# Patient Record
Sex: FEMALE | Race: WHITE | Employment: OTHER | URBAN - METROPOLITAN AREA
[De-identification: names, ages, dates, MRNs, and addresses within clinical notes are randomized per-mention and may not be internally consistent; named-entity substitution may affect disease eponyms.]

---

## 2021-02-02 ENCOUNTER — APPOINTMENT (OUTPATIENT)
Dept: CT IMAGING | Facility: CLINIC | Age: 36
End: 2021-02-02
Attending: EMERGENCY MEDICINE
Payer: MEDICAID

## 2021-02-02 ENCOUNTER — HOSPITAL ENCOUNTER (EMERGENCY)
Facility: CLINIC | Age: 36
Discharge: HOME | End: 2021-02-02
Attending: EMERGENCY MEDICINE | Admitting: EMERGENCY MEDICINE
Payer: MEDICAID

## 2021-02-02 VITALS
RESPIRATION RATE: 16 BRPM | TEMPERATURE: 98.4 F | BODY MASS INDEX: 25.03 KG/M2 | OXYGEN SATURATION: 100 % | WEIGHT: 136 LBS | HEART RATE: 75 BPM | SYSTOLIC BLOOD PRESSURE: 129 MMHG | HEIGHT: 62 IN | DIASTOLIC BLOOD PRESSURE: 73 MMHG

## 2021-02-02 DIAGNOSIS — R33.9 URINARY RETENTION: ICD-10-CM

## 2021-02-02 LAB
ALBUMIN SERPL-MCNC: 4.3 G/DL (ref 3.4–5)
ALBUMIN UR-MCNC: ABNORMAL MG/DL
ALP SERPL-CCNC: 66 U/L (ref 40–150)
ALT SERPL W P-5'-P-CCNC: 35 U/L (ref 0–50)
ANION GAP SERPL CALCULATED.3IONS-SCNC: 6 MMOL/L (ref 3–14)
APPEARANCE UR: CLEAR
AST SERPL W P-5'-P-CCNC: 18 U/L (ref 0–45)
B-HCG FREE SERPL-ACNC: <5 IU/L
BACTERIA #/AREA URNS HPF: ABNORMAL /HPF
BASOPHILS # BLD AUTO: 0 10E9/L (ref 0–0.2)
BASOPHILS NFR BLD AUTO: 0.4 %
BILIRUB SERPL-MCNC: 1.6 MG/DL (ref 0.2–1.3)
BILIRUB UR QL STRIP: ABNORMAL
BUN SERPL-MCNC: 7 MG/DL (ref 7–30)
CALCIUM SERPL-MCNC: 9.2 MG/DL (ref 8.5–10.1)
CHLORIDE SERPL-SCNC: 107 MMOL/L (ref 94–109)
CO2 SERPL-SCNC: 25 MMOL/L (ref 20–32)
COLOR UR AUTO: ABNORMAL
CREAT SERPL-MCNC: 0.7 MG/DL (ref 0.52–1.04)
DIFFERENTIAL METHOD BLD: NORMAL
EOSINOPHIL # BLD AUTO: 0 10E9/L (ref 0–0.7)
EOSINOPHIL NFR BLD AUTO: 0.1 %
ERYTHROCYTE [DISTWIDTH] IN BLOOD BY AUTOMATED COUNT: 12.5 % (ref 10–15)
GFR SERPL CREATININE-BSD FRML MDRD: >90 ML/MIN/{1.73_M2}
GLUCOSE SERPL-MCNC: 95 MG/DL (ref 70–99)
GLUCOSE UR STRIP-MCNC: ABNORMAL MG/DL
HCT VFR BLD AUTO: 40.9 % (ref 35–47)
HGB BLD-MCNC: 13.8 G/DL (ref 11.7–15.7)
HGB UR QL STRIP: ABNORMAL
IMM GRANULOCYTES # BLD: 0 10E9/L (ref 0–0.4)
IMM GRANULOCYTES NFR BLD: 0.3 %
KETONES UR STRIP-MCNC: ABNORMAL MG/DL
LEUKOCYTE ESTERASE UR QL STRIP: ABNORMAL
LYMPHOCYTES # BLD AUTO: 1.7 10E9/L (ref 0.8–5.3)
LYMPHOCYTES NFR BLD AUTO: 23.6 %
MCH RBC QN AUTO: 31.4 PG (ref 26.5–33)
MCHC RBC AUTO-ENTMCNC: 33.7 G/DL (ref 31.5–36.5)
MCV RBC AUTO: 93 FL (ref 78–100)
MONOCYTES # BLD AUTO: 0.4 10E9/L (ref 0–1.3)
MONOCYTES NFR BLD AUTO: 5.8 %
NEUTROPHILS # BLD AUTO: 5.1 10E9/L (ref 1.6–8.3)
NEUTROPHILS NFR BLD AUTO: 69.8 %
NITRATE UR QL: ABNORMAL
NRBC # BLD AUTO: 0 10*3/UL
NRBC BLD AUTO-RTO: 0 /100
PH UR STRIP: ABNORMAL PH (ref 5–7)
PLATELET # BLD AUTO: 370 10E9/L (ref 150–450)
POTASSIUM SERPL-SCNC: 3.7 MMOL/L (ref 3.4–5.3)
PROT SERPL-MCNC: 7.8 G/DL (ref 6.8–8.8)
RBC # BLD AUTO: 4.4 10E12/L (ref 3.8–5.2)
RBC #/AREA URNS AUTO: 3 /HPF (ref 0–2)
SODIUM SERPL-SCNC: 138 MMOL/L (ref 133–144)
SOURCE: ABNORMAL
SP GR UR STRIP: ABNORMAL (ref 1–1.03)
SQUAMOUS #/AREA URNS AUTO: <1 /HPF (ref 0–1)
UROBILINOGEN UR STRIP-MCNC: ABNORMAL MG/DL (ref 0–2)
WBC # BLD AUTO: 7.4 10E9/L (ref 4–11)
WBC #/AREA URNS AUTO: 2 /HPF (ref 0–5)

## 2021-02-02 PROCEDURE — 85025 COMPLETE CBC W/AUTO DIFF WBC: CPT | Performed by: EMERGENCY MEDICINE

## 2021-02-02 PROCEDURE — 99285 EMERGENCY DEPT VISIT HI MDM: CPT | Mod: 25

## 2021-02-02 PROCEDURE — 51700 IRRIGATION OF BLADDER: CPT | Performed by: UROLOGY

## 2021-02-02 PROCEDURE — 250N000011 HC RX IP 250 OP 636: Performed by: EMERGENCY MEDICINE

## 2021-02-02 PROCEDURE — 74177 CT ABD & PELVIS W/CONTRAST: CPT | Mod: 59

## 2021-02-02 PROCEDURE — 80053 COMPREHEN METABOLIC PANEL: CPT | Performed by: EMERGENCY MEDICINE

## 2021-02-02 PROCEDURE — 250N000013 HC RX MED GY IP 250 OP 250 PS 637: Performed by: EMERGENCY MEDICINE

## 2021-02-02 PROCEDURE — 81001 URINALYSIS AUTO W/SCOPE: CPT | Performed by: EMERGENCY MEDICINE

## 2021-02-02 PROCEDURE — 96374 THER/PROPH/DIAG INJ IV PUSH: CPT

## 2021-02-02 PROCEDURE — 99203 OFFICE O/P NEW LOW 30 MIN: CPT | Mod: 25 | Performed by: UROLOGY

## 2021-02-02 PROCEDURE — 84702 CHORIONIC GONADOTROPIN TEST: CPT

## 2021-02-02 RX ORDER — OXYCODONE HYDROCHLORIDE 5 MG/1
5 TABLET ORAL ONCE
Status: COMPLETED | OUTPATIENT
Start: 2021-02-02 | End: 2021-02-02

## 2021-02-02 RX ORDER — IOPAMIDOL 755 MG/ML
500 INJECTION, SOLUTION INTRAVASCULAR ONCE
Status: COMPLETED | OUTPATIENT
Start: 2021-02-02 | End: 2021-02-02

## 2021-02-02 RX ORDER — KETOROLAC TROMETHAMINE 15 MG/ML
15 INJECTION, SOLUTION INTRAMUSCULAR; INTRAVENOUS ONCE
Status: COMPLETED | OUTPATIENT
Start: 2021-02-02 | End: 2021-02-02

## 2021-02-02 RX ORDER — LIDOCAINE HYDROCHLORIDE 20 MG/ML
JELLY TOPICAL
Status: DISCONTINUED
Start: 2021-02-02 | End: 2021-02-03 | Stop reason: HOSPADM

## 2021-02-02 RX ADMIN — IOPAMIDOL 69 ML: 755 INJECTION, SOLUTION INTRAVENOUS at 18:34

## 2021-02-02 RX ADMIN — KETOROLAC TROMETHAMINE 15 MG: 15 INJECTION, SOLUTION INTRAMUSCULAR; INTRAVENOUS at 18:01

## 2021-02-02 RX ADMIN — OXYCODONE HYDROCHLORIDE 5 MG: 5 TABLET ORAL at 22:10

## 2021-02-02 ASSESSMENT — ENCOUNTER SYMPTOMS
FEVER: 0
VOMITING: 0
DYSURIA: 1
HEMATURIA: 1
DIFFICULTY URINATING: 1
NAUSEA: 0
FLANK PAIN: 1

## 2021-02-02 ASSESSMENT — MIFFLIN-ST. JEOR: SCORE: 1270.14

## 2021-02-02 NOTE — ED PROVIDER NOTES
History   Chief Complaint:  Abdominal Pain and Dysuria     The history is provided by the patient.      Aarti Clemons is a 35 year old female who presents with abdominal pain and dysuria. The patient explains that she has been experiencing severe bladder pain since August of 2020. She has gone into the clinic multiple times; in some instances, they found nothing that caused concern for infection, but the pain would not subside so when she would return they would provide her with antibiotics. She was prompted to come in to the ED today after finding her urine to be red last night. She has noticed blood in her urine twice. She explains that she feels pain and burning when attempting to urinate and she cannot get a steady stream started, only droplets. The patient also notices that she experiences sharp flank pain every now and then. She was given Bactrim, Oxybutynin, and Azo to help with her symptoms, but she explains that nothing has helped her. She has not tried taking Ibuprofen or Tylenol. The last time she was prescribed antibiotics before recent urgent care visit was back in October.    The patient is on Nexplanon for birth control, after having four children. Her last birth was almost 4 years ago.   The patient denies any abnormal vaginal discharge or any chance of STD's. She also denies fever, nausea, or vomiting.     Review of Systems   Constitutional: Negative for fever.   Gastrointestinal: Negative for nausea and vomiting. Abdominal pain: lower bladder region.   Genitourinary: Positive for decreased urine volume, difficulty urinating, dysuria, flank pain (occasional ), hematuria and pelvic pain. Negative for vaginal discharge.   All other systems reviewed and are negative.    Allergies:  No Known Allergies    Medications:  Bactrim  Oxybutynin  Azo    Past Medical History:    Pregnancy (x4)     Social History:  Presents with male   Has 4 children     Physical Exam     Patient Vitals for  "the past 24 hrs:   BP Temp Temp src Pulse Resp SpO2 Height Weight   02/02/21 2120 133/79 -- -- -- -- 98 % -- --   02/02/21 1848 -- -- -- -- -- 99 % -- --   02/02/21 1800 -- -- -- -- -- 97 % -- --   02/02/21 1730 115/77 -- -- 77 -- 98 % -- --   02/02/21 1602 (!) 152/107 98.4  F (36.9  C) Temporal 84 18 99 % 1.575 m (5' 2\") 61.7 kg (136 lb)       Physical Exam  Nursing note and vitals reviewed.  Constitutional: Cooperative.   HENT:   Mouth/Throat: Moist mucous membranes.   Eyes: EOMI, nonicteric sclera  Cardiovascular: Normal rate, regular rhythm, no murmurs, rubs, or gallops  Pulmonary/Chest: Effort normal and breath sounds normal. No respiratory distress. No wheezes. No rales.   Abdominal: Soft. Suprapubic tenderness and fullness, nondistended, no guarding or rigidity.   Musculoskeletal: Normal range of motion.   Neurological: Alert. Moves all extremities spontaneously.   Skin: Skin is warm and dry. No rash noted.   Psychiatric: Normal mood and affect.       Emergency Department Course     Imaging:  CT Abdomen/Pelvis  with IV Contrast only TRAUMA/AAA:  1.  Prominent distention of urinary bladder suggesting neurogenic bladder or bladder outlet obstruction. Recommend draining and determining true post void residual volume.  2.  There is likely a small benign mature dermoid within left ovary. Dominant follicle within right ovary.  3.  Mild fatty infiltration of liver.   As per radiology.    Laboratory:  CMP: Bilirubin 1.6(H) o/w WNL (Creatinine: 0.70)  CBC: WBC: 7.4, HGB: 13.8, PLT: 370    ISTAT HCG Quantitative Pregnancy POCT: <5.0  UA with Microscopic reflex to Culture: Glucose unable to assay due to interfering substance(A), Bilirubin unable to assay due to interfering substance(A), Ketones unable to assay due to interfering substance(A), Blood unable to assay due to interfering substance(A), Protein Albumin unable to assay due to interfering substance(A), Nitrite unable to assay due to interfering substance(A), " Leukocyte Esterase unable to assay due to interfering substance(A), RBC 3(H), Bacteria few (A) o/w Negative    Emergency Department Course:    Reviewed:  I reviewed nursing notes, vitals and past medical history    Assessments:  1609 I obtained history and examined the patient as noted above.   1910 I rechecked the patient and explained findings.   2040 I rechecked the patient.     Consults:   1918 I spoke with Dr. Palomo from urology   2037 I spoke with Dr. Palomo from urology     Interventions:  1801 Toradol 15 mg IV    Disposition:  The patient was discharged to home.     Impression & Plan     Medical Decision Making:  Pt presents with several month history of difficulty/painful urination. Broad ddx considered. No evidence of UTI on UA, though she is currently on bactrim. Ct obtained given suprapubic pain and fullness and is notable for distended bladder. Certainly, this is cause of pain today. We were unable to place catheter in ED, therefore reached out to Dr. Palomo from urology who graciously came to bedside and was able to place Lynn.  Plan will be for 1 week catheter, stop oxybutynin, and urology follow-up for removal and further testing.  No signs of LAWSON.  Patient feels improved after catheter placement. She is in stable condition at the time of discharge, indications for return to the ED were discussed as well as follow up. All questions were answered and she is in agreement with the plan.      Diagnosis:    ICD-10-CM    1. Urinary retention  R33.9        Scribe Disclosure:  ANI, Claudette Sanchez, am serving as a scribe at 5:04 PM on 2/2/2021 to document services personally performed by Brennon Castaneda MD based on my observations and the provider's statements to me.            Brennon Castaneda MD  02/03/21 9955

## 2021-02-03 ENCOUNTER — NURSE TRIAGE (OUTPATIENT)
Dept: NURSING | Facility: CLINIC | Age: 36
End: 2021-02-03

## 2021-02-03 ENCOUNTER — APPOINTMENT (OUTPATIENT)
Dept: INTERPRETER SERVICES | Facility: CLINIC | Age: 36
End: 2021-02-03
Payer: MEDICAID

## 2021-02-03 NOTE — CONSULTS
Urology Consult History and Physical    Name: Aarti Clemons    MRN: [id number]   YOB: 1985       We were asked to see Aarti Clemons at the request of Dr. Waddell for evaluation and treatment of urinary retention.          Chief Complaint:   Urinary retention     History is obtained from the patient          History of Present Illness:   Aarti Clemons is a 35 year old female who is being seen for evaluation of urinary retention.  She notes that she has been having increased difficulty with urination for the past 7 months.  She notes that she has been having increased small volume voids with a sensation of bladder fullness and at times some bladder pain and pelvic pain.  She was previously seen in October in an urgent care for possible UTI which was negative.  Her symptoms worsened over the weekend and she was seen again in urgent care setting with possible UTI and started on Bactrim was also given a prescription for Ditropan for urinary frequency.  The past few days she has had worsening increased difficulty urinating with really only small drips of urine passing through the urethra.    She denies any prior vaginal surgery.  She has 4 children delivered vaginally.  She does not recall any significant vaginal lacerations or urethral lacerations which required surgical intervention at the time.  Her youngest child is 4 years old.    CT scan obtained in the emergency room demonstrates significantly distended bladder.  Emergency room RN staff unable to successfully pass urethra into the bladder with resistance felt about 2 cm into the urethral meatus.           Past Medical History:   No past medical history on file.         Past Surgical History:   No past surgical history on file.         Social History:     Social History     Tobacco Use     Smoking status: Not on file   Substance Use Topics     Alcohol use: Not on file            Family History:   No family history on  "file.           Allergies:   No Known Allergies         Medications:     Current Facility-Administered Medications   Medication     lidocaine (XYLOCAINE) 2 % external gel     No current outpatient medications on file.             Review of Systems:    ROS: 10 point ROS neg other than the symptoms noted above in the HPI.          Physical Exam:     Patient Vitals for the past 24 hrs:   BP Temp Temp src Pulse Resp SpO2 Height Weight   02/02/21 2120 133/79 -- -- -- -- 98 % -- --   02/02/21 1848 -- -- -- -- -- 99 % -- --   02/02/21 1800 -- -- -- -- -- 97 % -- --   02/02/21 1730 115/77 -- -- 77 -- 98 % -- --   02/02/21 1602 (!) 152/107 98.4  F (36.9  C) Temporal 84 18 99 % 1.575 m (5' 2\") 61.7 kg (136 lb)     General: age-appropriate appearing female in NAD  HEENT: Head AT/NC, EOMI, CN Grossly intact  Lungs: no respiratory distress, or pursed lip breathing  Heart: No obvious jugular venous distension present  Back: no bony midline tenderness, no CVAT bilaterally.  Abdomen: Distended bladder in the low midline with moderate tenderness  : normal female external genitalia with no evidence of prolapse  Lymph: no palpable inguinal lymphadenopathy.  LE: no edema. pneumoboots in place.  Musculoskeltal: extremities normal, no peripheral edema  Skin: no suspicious lesions or rashes  Neuro: Alert, oriented, speech and mentation normal;  moving all 4 extremities equally.  Psych: affect and mood normal          Data:   All laboratory data reviewed:    Recent Labs   Lab 02/02/21  1723   WBC 7.4   HGB 13.8        Recent Labs   Lab 02/02/21  1723      POTASSIUM 3.7   CHLORIDE 107   CO2 25   BUN 7   CR 0.70   GLC 95   ANAY 9.2     Recent Labs   Lab 02/02/21  1723   COLOR [place]   APPEARANCE Clear   URINEGLC Unable to assay due to interfering substance*   URINEBILI Unable to assay due to interfering substance*   URINEKETONE Unable to assay due to interfering substance*   SG Unable to assay due to interfering substance "   URINEPH Unable to assay due to interfering substance   PROTEIN Unable to assay due to interfering substance*   NITRITE Unable to assay due to interfering substance*   LEUKEST Unable to assay due to interfering substance*   RBCU 3*   WBCU 2     IMAGING:  All pertinent imaging reviewed:    All imaging studies reviewed by me.  I personally reviewed these imaging films.  A formal report from radiology will follow.    FINDINGS:   LOWER CHEST: Normal.     HEPATOBILIARY: Mild diffuse fatty infiltration of liver.     PANCREAS: Normal.     SPLEEN: Normal.     ADRENAL GLANDS: Normal.     KIDNEYS/BLADDER: Kidneys appear normal.     However, there is prominent distention of the urinary bladder with estimated volume of approximately 700 mL.     BOWEL: No obstruction or inflammatory change. Appendix normal.     LYMPH NODES: Normal.     VASCULATURE: Unremarkable.     PELVIC ORGANS: Both ovaries displaced superiorly by the distended bladder. 2.8 cm dominant follicle within right ovary. Left ovary contains a 1.8 cm structure that appears to have a lipid fluid level within it, likely a small benign dermoid cyst. No free   fluid.     MUSCULOSKELETAL: Normal.                                                                      IMPRESSION:   1.  Prominent distention of urinary bladder suggesting neurogenic bladder or bladder outlet obstruction. Recommend draining and determining true post void residual volume.  2.  There is likely a small benign mature dermoid within left ovary. Dominant follicle within right ovary.  3.  Mild fatty infiltration of liver.         Impression and Plan:   Impression:   35-year-old female with acute on chronic urinary retention      Plan:   Acute on chronic urinary retention  -We discussed that the etiology of her urinary retention is not clear at this time given her lack of transvaginal surgery  -We discussed the need for bladder drainage with a urethral catheter  -She was prepped in clean/sterile  technique.  6 cc of lidocaine jelly was instilled into the urethra.  I was able to advance a 16 Austrian Lynn catheter into the bladder.  There was moderate amount of resistance noted about 2 cm from the meatus, however the catheter was able to pass into the bladder.  The bladder was drained with greater than 1 L.  -We will plan to maintain his Lynn catheter for least 1 week  -I will arrange office follow-up for a trial of void and possible cystoscopy with either myself or my partner Dr. Caraballo  -We discussed that she should continue her previously prescribed course of Bactrim  -We discussed that she should stop her Ditropan     Jose Palomo MD   Urology  [facility]  Clinic Phone [phone]

## 2021-02-03 NOTE — ED NOTES
Attempted for indwelling abdullahi catheter insertion with difficulties by BRAD Sadler. Was able to insert catheter into the urethra, but unfortunately was unable to advance the catheter further. MD cui.

## 2021-02-03 NOTE — DISCHARGE INSTRUCTIONS
Stop oxybutynin.  Continue Bactrim.   Follow-up with urology in about 1 week. They will take catheter out at that time.

## 2021-02-03 NOTE — ED NOTES
Pt back from CT, states her abdominal pain is still there, no relief with Toradol. Also states she now has a headache. Will notify provider.

## 2021-02-04 NOTE — TELEPHONE ENCOUNTER
"  \"I was in the ER yesterday 2/2 see epic and I have a catheter. I am not having pain. I do see pee in the bag , I just emptied it. But I can see a little more of the tubing. I am afraid it will come out.\"   Denies sx   Gave home care advice and when to seek emergency care  Call back if needed.  Triny Almanza RN [facility]    COVID 19 Nurse Triage Plan/Patient Instructions    Please be aware that novel coronavirus (COVID-19) may be circulating in the community. If you develop symptoms such as fever, cough, or SOB or if you have concerns about the presence of another infection including coronavirus (COVID-19), please contact your health care provider or visit [email or web address].     Disposition/Instructions    Additional COVID19 information to add for patients.   How can I protect others?  If you have symptoms (fever, cough, body aches or trouble breathing): Stay home and away from others (self-isolate) until:    At least 10 days have passed since your symptoms started, And     You ve had no fever--and no medicine that reduces fever--for 1 full day (24 hours), And      Your other symptoms have resolved (gotten better).     If you don t have symptoms, but a test showed that you have COVID-19 (you tested positive):    Stay home and away from others (self-isolate). Follow the tips under \"How do I self-isolate?\" below for 10 days (20 days if you have a weak immune system).    You don't need to be retested for COVID-19 before going back to school or work. As long as you're fever-free and feeling better, you can go back to school, work and other activities after waiting the 10 or 20 days.     How do I self-isolate?    Stay in your own room, even for meals. Use your own bathroom if you can.     Stay away from others in your home. No hugging, kissing or shaking hands. No visitors.    Don t go to work, school or anywhere else.     Clean  high touch  surfaces often (doorknobs, counters, handles, etc.). Use a " Will continue DuoNebs, steroids, and antibiotics  Will continue supplemental oxygen via nasal cannula to keep sats greater than 90%  Plan to discharge patient when clinically stable  However patient has met with palliative Care and is interested in hospice  Has signed an [place] post and has requested to be a DNR  Plan for discharge tomorrow with home hospice     household cleaning spray or wipes. You ll find a full list on the EPA website:  www.epa.gov/pesticide-registration/list-n-disinfectants-use-against-sars-cov-2.    Cover your mouth and nose with a mask, tissue or washcloth to avoid spreading germs.    Wash your hands and face often. Use soap and water.    Caregivers in these groups are at risk for severe illness due to COVID-19:  o People 65 years and older  o People who live in a nursing home or long-term care facility  o People with chronic disease (lung, heart, cancer, diabetes, kidney, liver, immunologic)  o People who have a weakened immune system, including those who:  - Are in cancer treatment  - Take medicine that weakens the immune system, such as corticosteroids  - Had a bone marrow or organ transplant  - Have an immune deficiency  - Have poorly controlled HIV or AIDS  - Are obese (body mass index of 40 or higher)  - Smoke regularly    Caregivers should wear gloves while washing dishes, handling laundry and cleaning bedrooms and bathrooms.    Use caution when washing and drying laundry: Don t shake dirty laundry, and use the warmest water setting that you can.    For more tips, go to www.cdc.gov/coronavirus/2019-ncov/downloads/10Things.pdf.    How can I take care of myself?  1. Get lots of rest. Drink extra fluids (unless a doctor has told you not to).     2. Take Tylenol (acetaminophen) for fever or pain. If you have liver or kidney problems, ask your family doctor if it s okay to take Tylenol.     Adults can take either:     650 mg (two 325 mg pills) every 4 to 6 hours, or     1,000 mg (two 500 mg pills) every 8 hours as needed.     Note: Don t take more than 3,000 mg in one day.   Acetaminophen is found in many medicines (both prescribed and over-the-counter medicines). Read all labels to be sure you don t take too much.     For children, check the Tylenol bottle for the right dose. The dose is based on the child s age or weight.    3. If you have other  health problems (like cancer, heart failure, an organ transplant or severe kidney disease): Call your specialty clinic if you don t feel better in the next 2 days.    4. Know when to call 911: Emergency warning signs include:    Trouble breathing or shortness of breath    Pain or pressure in the chest that doesn t go away    Feeling confused like you haven t felt before, or not being able to wake up    Bluish-colored lips or face    What are the symptoms of COVID-19?     The most common symptoms are cough, fever and trouble breathing.     Less common symptoms include body aches, chills, diarrhea (loose, watery poops), fatigue (feeling very tired), headache, runny nose, sore throat and loss of smell.    COVID-19 can cause severe coughing (bronchitis) and lung infection (pneumonia).    How does it spread?     The virus may spread when a person coughs or sneezes into the air. The virus can travel about 6 feet this way, and it can live on surfaces.      Common  (household disinfectants) will kill the virus.    Who is at risk?  Anyone can catch COVID-19 if they re around someone who has the virus.    How can others protect themselves?     Stay away from people who have COVID-19 (or symptoms of COVID-19).    Wash hands often with soap and water. Or, use hand  with at least 60% alcohol.    Avoid touching the eyes, nose or mouth.     Wear a face mask when you go out in public, when sick or when caring for a sick person.    Where can I get more information?    [facility]: About COVID-19: [email or web address]    CDC: What to Do If You re Sick: www.cdc.gov/coronavirus/2019-ncov/about/steps-when-sick.html    CDC: Ending Home Isolation: www.cdc.gov/coronavirus/2019-ncov/hcp/disposition-in-home-patients.html     CDC: Caring for Someone: www.cdc.gov/coronavirus/2019-ncov/if-you-are-sick/care-for-someone.html     MD: Interim Guidance for Hospital Discharge to Home:  www.[facility]./diseases/coronavirus/hcp/hospdischarge.pdf    [facility] clinical trials (COVID-19 research studies): clinicalaffairs.[facility]-clinical-trials     Below are the COVID-19 hotlines at the [place] Department of Health ([facility]). Interpreters are available.   o For health questions: Call 217-742-2856 or 1-816.217.5352 (7 a.m. to 7 p.m.)  o For questions about schools and childcare: Call 311-968-2783 or 1-507.385.8103 (7 a.m. to 7 p.m.)          Thank you for taking steps to prevent the spread of this virus.  o Limit your contact with others.  o Wear a simple mask to cover your cough.  o Wash your hands well and often.    Resources    [facility]: About COVID-19: [email or web address]    CDC: What to Do If You're Sick: www.cdc.gov/coronavirus/2019-ncov/about/steps-when-sick.html    CDC: Ending Home Isolation: www.cdc.gov/coronavirus/2019-ncov/hcp/disposition-in-home-patients.html     CDC: Caring for Someone: www.cdc.gov/coronavirus/2019-ncov/if-you-are-sick/care-for-someone.html     [facility]: Interim Guidance for Hospital Discharge to Home: www.[facility]./diseases/coronavirus/hcp/hospdischarge.pdf    [facility] clinical trials (COVID-19 research studies): clinicalaffairs.[facility]-clinical-trials     Below are the COVID-19 hotlines at the [place] Department [facility] ([facility]). Interpreters are available.   o For health questions: Call 040-682-5238 or 1-789.985.4603 (7 a.m. to 7 p.m.)  o For questions about schools and childcare: Call 296-570-2249 or 1-792.708.3921 (7 a.m. to 7 p.m.)                   Additional Information    Urinary catheter care, questions about    Protocols used: URINARY CATHETER SYMPTOMS AND [id number]

## 2021-02-07 ENCOUNTER — NURSE TRIAGE (OUTPATIENT)
Dept: NURSING | Facility: CLINIC | Age: 36
End: 2021-02-07

## 2021-02-07 ENCOUNTER — HOSPITAL ENCOUNTER (EMERGENCY)
Facility: CLINIC | Age: 36
Discharge: HOME | End: 2021-02-08
Attending: EMERGENCY MEDICINE | Admitting: EMERGENCY MEDICINE
Payer: MEDICAID

## 2021-02-07 VITALS
RESPIRATION RATE: 16 BRPM | DIASTOLIC BLOOD PRESSURE: 91 MMHG | OXYGEN SATURATION: 96 % | SYSTOLIC BLOOD PRESSURE: 137 MMHG | TEMPERATURE: 97.6 F | HEART RATE: 96 BPM

## 2021-02-07 DIAGNOSIS — Z97.8 COMPLAINT ABOUT URINARY CATHETER: ICD-10-CM

## 2021-02-07 DIAGNOSIS — N93.9 VAGINAL BLEEDING: ICD-10-CM

## 2021-02-07 DIAGNOSIS — R39.9 COMPLAINT ABOUT URINARY CATHETER: ICD-10-CM

## 2021-02-07 DIAGNOSIS — R33.9 URINARY RETENTION: ICD-10-CM

## 2021-02-07 LAB
ALBUMIN UR-MCNC: 100 MG/DL
APPEARANCE UR: CLEAR
BACTERIA #/AREA URNS HPF: ABNORMAL /HPF
BILIRUB UR QL STRIP: NEGATIVE
CAOX CRY #/AREA URNS HPF: ABNORMAL /HPF
COLOR UR AUTO: YELLOW
GLUCOSE UR STRIP-MCNC: NEGATIVE MG/DL
HGB UR QL STRIP: ABNORMAL
KETONES UR STRIP-MCNC: NEGATIVE MG/DL
LEUKOCYTE ESTERASE UR QL STRIP: ABNORMAL
MUCOUS THREADS #/AREA URNS LPF: PRESENT /LPF
NITRATE UR QL: NEGATIVE
PH UR STRIP: 6.5 PH (ref 5–7)
RBC #/AREA URNS AUTO: 74 /HPF (ref 0–2)
SOURCE: ABNORMAL
SP GR UR STRIP: 1.03 (ref 1–1.03)
SQUAMOUS #/AREA URNS AUTO: 3 /HPF (ref 0–1)
UROBILINOGEN UR STRIP-MCNC: NORMAL MG/DL (ref 0–2)
WBC #/AREA URNS AUTO: 21 /HPF (ref 0–5)

## 2021-02-07 PROCEDURE — 36415 COLL VENOUS BLD VENIPUNCTURE: CPT | Performed by: EMERGENCY MEDICINE

## 2021-02-07 PROCEDURE — 87086 URINE CULTURE/COLONY COUNT: CPT | Performed by: EMERGENCY MEDICINE

## 2021-02-07 PROCEDURE — 81001 URINALYSIS AUTO W/SCOPE: CPT | Performed by: EMERGENCY MEDICINE

## 2021-02-07 PROCEDURE — 99283 EMERGENCY DEPT VISIT LOW MDM: CPT

## 2021-02-07 PROCEDURE — 85025 COMPLETE CBC W/AUTO DIFF WBC: CPT | Performed by: EMERGENCY MEDICINE

## 2021-02-07 PROCEDURE — 84703 CHORIONIC GONADOTROPIN ASSAY: CPT | Performed by: EMERGENCY MEDICINE

## 2021-02-07 PROCEDURE — 80048 BASIC METABOLIC PNL TOTAL CA: CPT | Performed by: EMERGENCY MEDICINE

## 2021-02-07 ASSESSMENT — ENCOUNTER SYMPTOMS
HEMATURIA: 1
ABDOMINAL PAIN: 0

## 2021-02-08 LAB
ANION GAP SERPL CALCULATED.3IONS-SCNC: 6 MMOL/L (ref 3–14)
BASOPHILS # BLD AUTO: 0 10E9/L (ref 0–0.2)
BASOPHILS NFR BLD AUTO: 0.4 %
BUN SERPL-MCNC: 8 MG/DL (ref 7–30)
CALCIUM SERPL-MCNC: 8.5 MG/DL (ref 8.5–10.1)
CHLORIDE SERPL-SCNC: 107 MMOL/L (ref 94–109)
CO2 SERPL-SCNC: 27 MMOL/L (ref 20–32)
CREAT SERPL-MCNC: 0.7 MG/DL (ref 0.52–1.04)
DIFFERENTIAL METHOD BLD: NORMAL
EOSINOPHIL # BLD AUTO: 0.1 10E9/L (ref 0–0.7)
EOSINOPHIL NFR BLD AUTO: 1.7 %
ERYTHROCYTE [DISTWIDTH] IN BLOOD BY AUTOMATED COUNT: 12.5 % (ref 10–15)
GFR SERPL CREATININE-BSD FRML MDRD: >90 ML/MIN/{1.73_M2}
GLUCOSE SERPL-MCNC: 95 MG/DL (ref 70–99)
HCG SERPL QL: NEGATIVE
HCT VFR BLD AUTO: 38 % (ref 35–47)
HGB BLD-MCNC: 12.8 G/DL (ref 11.7–15.7)
IMM GRANULOCYTES # BLD: 0 10E9/L (ref 0–0.4)
IMM GRANULOCYTES NFR BLD: 0.1 %
LYMPHOCYTES # BLD AUTO: 3.5 10E9/L (ref 0.8–5.3)
LYMPHOCYTES NFR BLD AUTO: 41 %
MCH RBC QN AUTO: 31.6 PG (ref 26.5–33)
MCHC RBC AUTO-ENTMCNC: 33.7 G/DL (ref 31.5–36.5)
MCV RBC AUTO: 94 FL (ref 78–100)
MONOCYTES # BLD AUTO: 0.4 10E9/L (ref 0–1.3)
MONOCYTES NFR BLD AUTO: 5.2 %
NEUTROPHILS # BLD AUTO: 4.4 10E9/L (ref 1.6–8.3)
NEUTROPHILS NFR BLD AUTO: 51.6 %
NRBC # BLD AUTO: 0 10*3/UL
NRBC BLD AUTO-RTO: 0 /100
PLATELET # BLD AUTO: 354 10E9/L (ref 150–450)
POTASSIUM SERPL-SCNC: 4.2 MMOL/L (ref 3.4–5.3)
RBC # BLD AUTO: 4.05 10E12/L (ref 3.8–5.2)
SODIUM SERPL-SCNC: 140 MMOL/L (ref 133–144)
WBC # BLD AUTO: 8.5 10E9/L (ref 4–11)

## 2021-02-08 NOTE — TELEPHONE ENCOUNTER
Patient calling -  #[id number] Oziel brought onto line.    Patient says she has a tube for her urine and has noticed blood with blood clots coming from around the tube.  She says it is not her period because she does not get periods.  Says she also has some pain in urinary tract.    Triaged to disposition of See Physician Within 4 Hours (or PCP triage).  She says she does not have a primary care physician.      Chelsey Cooper RN  Triage Nurse Advisor    COVID 19 Nurse Triage Plan/Patient Instructions    Please be aware that novel coronavirus (COVID-19) may be circulating in the community. If you develop symptoms such as fever, cough, or SOB or if you have concerns about the presence of another infection including coronavirus (COVID-19), please contact your health care provider or visit [email or web address].     Disposition/Instructions    In-Person Visit with provider recommended. Reference Visit Selection Guide.    Thank you for taking steps to prevent the spread of this virus.  o Limit your contact with others.  o Wear a simple mask to cover your cough.  o Wash your hands well and often.    Resources    [facility]: About COVID-19: [email or web address]    CDC: What to Do If You're Sick: www.cdc.gov/coronavirus/2019-ncov/about/steps-when-sick.html    CDC: Ending Home Isolation: www.cdc.gov/coronavirus/2019-ncov/hcp/disposition-in-home-patients.html     CDC: Caring for Someone: www.cdc.gov/coronavirus/2019-ncov/if-you-are-sick/care-for-someone.html     [facility]: Interim Guidance for Hospital Discharge to Home: www.health.[facility].[place]/diseases/coronavirus/hcp/hospdischarge.pdf    [facility] clinical trials (COVID-19 research studies): [email or web address].[email or web address].edu/um-clinical-trials     Below are the COVID-19 hotlines at the [place] Department of [facility] ([facility]). Interpreters are available.   o For health questions: Call 142-178-4914 or 1-452.182.9952 (7 a.m. to 7 p.m.)  o For questions about  schools and childcare: Call 869-723-1735 or 1-487.337.7670 (7 a.m. to 7 p.m.)       Reason for Disposition    Bleeding around catheter (e.g., from penis or female urethra)    Additional Information    Negative: Shock suspected (e.g., cold/pale/clammy skin, too weak to stand, low BP, rapid pulse)    Negative: Sounds like a life-threatening emergency to the triager    Negative: [1] Catheter was accidentally pulled-out AND [2] bright red continuous bleeding    Negative: SEVERE abdominal pain    Negative: Fever > 100.5 F (38.1 C)    Negative: [1] Drinking very little AND [2] dehydration suspected (e.g., no urine > 12 hours, very dry mouth, very lightheaded)    Negative: Patient sounds very sick or weak to the triager    Protocols used: URINARY CATHETER SYMPTOMS AND [id number]

## 2021-02-08 NOTE — ED PROVIDER NOTES
History   Chief Complaint:  Hematuria       HPI   Aarti Clemons is a 35 year old female who presents with hematuria. The patient states she has had bleeding around the outside of her catheter (placed 2/2 for urinary retention) for the last several days, and the blood goes outside of the bag; no blood noted within the abdullahi bag. She also notes pain around the catheter. She says she has not had her period for two years due to an implanon.  She does have a history of pregnancy, and she has 4 children. She does have an appointment with urology on 2/11. She does not know if the bleeding is vaginal.  Has been experiencing suprapubic pain since 8/20, which persists.      Review of Systems   Gastrointestinal: Negative for abdominal pain.   Genitourinary: Positive for hematuria. Negative for vaginal bleeding.   All other systems reviewed and are negative.      Allergies:  The patient has no known allergies.       Medications:  The patient takes no regular medications.      Past Medical History:    The patient denies past medical history.       Past Surgical History:    The patient denies past surgical history.        Family History:    The patient denies past family history.       Social History:  The patient was unaccompanied to the ED.  The patient has children.    Physical Exam     Patient Vitals for the past 24 hrs:   BP Temp Temp src Pulse Resp SpO2   02/07/21 2239 (!) 137/91 97.6  F (36.4  C) Oral 96 16 96 %       Physical Exam    Eyes: No discharge, symmetrical lids  ENT: Moist mucous membranes, no ear discharge  Neck: Full range of motion  Respiratory: CTAB, no wheezes  Cardiovascular: Regular rate and rhythm, no lower extremity edema  Chest: Equal rise  Gastrointestinal: Soft. Nondistended. Suprapubic TTP. No rebound or guarding  Musculoskeletal: No gross deformities.   Skin: Warm and well perfused. No visible rash.  Neurologic: Moves all extremities, speech fluent without dysarthria  Psychiatric:  Appropriate affect, alert and interactive  : Abdullahi in place.  Dark maroon blood in vaginal vault, cleared with [place] swabs; scant dark maroon blood from cervix, which is closed.  No active bright red bleeding.    Emergency Department Course     Laboratory:  CBC: WBC 8.5, HGB 12.8,    BMP: AWNL (Creatinine 0.70)    HCG Qualitative Blood: Negative    UA with micro: Blood moderate (A), Protein albumin 100 (A), Leukocyte esterase moderate (A), RBC/HPF 74 (H), WBC/HPF 21 (H), Bacteria few (A), Squamous epithelial/HPF 3 (H), Mucous present (A), Calcium oxalate few (A) o/w negative  Urine Culture Aerobic Bacteria: Pending      Procedures      Emergency Department Course:    Reviewed:  I reviewed nursing notes, vitals, and past medical history    Assessments:  2328 I obtained history and examined the patient as noted above.   0043 I rechecked the patient and explained findings. I performed a pelvic exam of the patient as noted above.     Disposition:  The patient was discharged to home.     Impression & Plan     Medical Decision MakinF, recently seen here in the ED 2/ for suprapubic pain, dysuria, found to have a highly distended bladder, with abdullahi placed at that time by Urology, presenting for bleeding, that she is unsure is vaginal or around her abdullahi catheter.  DDx includes but is not limited to vaginal bleeding, DUB, pregnancy, urethral bleeding   exam as above -- bleeding is vaginal in nature; dark maroon blood in vault, with scant dark maroon blood per os, which is closed; no bright red bleeding noted.  Labs obtained.  Hgb reassuring.  Pregnancy test negative.  Would not be wholly unexpected to have occasional episodes of vaginal bleeding even with implanon -- suspect this to be the case presently and advised pt as such -- bleeding does not appear to be coming from the urethra, which appears atraumatic on exam.  Advised continued close follow-up with urology, advised follow-up with gyn to discuss  vaginal bleeding and implant.  Strict return precautions given.          Diagnosis:    ICD-10-CM    1. Vaginal bleeding  N93.9 Urine Culture Aerobic Bacterial   2. Complaint about urinary catheter  R39.9     Z97.8    3. Urinary retention  R33.9        Discharge Medications:  There are no discharge medications for this patient.      Scribe Disclosure:  Jammie LAW, am serving as a scribe at 11:27 PM on 2/7/2021 to document services personally performed by Víctor Plata MD based on my observations and the provider's statements to me.      Víctor Plata MD  02/08/21 0441

## 2021-02-08 NOTE — ED TRIAGE NOTES
Hematuria for several days.  Had abdullahi cath placed in this ER 2/2 for urinary retention and bladder pain.

## 2021-02-09 LAB
BACTERIA SPEC CULT: NO GROWTH
Lab: NORMAL
SPECIMEN SOURCE: NORMAL

## 2021-02-23 ENCOUNTER — APPOINTMENT (OUTPATIENT)
Dept: INTERPRETER SERVICES | Facility: CLINIC | Age: 36
End: 2021-02-23
Payer: MEDICAID

## 2021-02-24 ENCOUNTER — OFFICE VISIT (OUTPATIENT)
Dept: UROLOGY | Facility: CLINIC | Age: 36
End: 2021-02-24
Payer: MEDICAID

## 2021-02-24 VITALS
WEIGHT: 136 LBS | SYSTOLIC BLOOD PRESSURE: 118 MMHG | HEIGHT: 62 IN | DIASTOLIC BLOOD PRESSURE: 80 MMHG | BODY MASS INDEX: 25.03 KG/M2

## 2021-02-24 DIAGNOSIS — R33.9 URINARY RETENTION: Primary | ICD-10-CM

## 2021-02-24 PROCEDURE — 51798 US URINE CAPACITY MEASURE: CPT | Performed by: UROLOGY

## 2021-02-24 PROCEDURE — 52000 CYSTOURETHROSCOPY: CPT | Performed by: UROLOGY

## 2021-02-24 ASSESSMENT — MIFFLIN-ST. JEOR: SCORE: 1270.14

## 2021-02-24 ASSESSMENT — PAIN SCALES - GENERAL: PAINLEVEL: NO PAIN (0)

## 2021-02-24 NOTE — NURSING NOTE
"Chief Complaint   Patient presents with     Urinary Retention     Catheter removel,cystosocpy and trial of void       Blood pressure 118/80, height 1.575 m (5' 2\"), weight 61.7 kg (136 lb), not currently breastfeeding. Body mass index is 24.87 kg/m .    There is no problem list on file for this patient.      No Known Allergies    No current outpatient medications on file.           Catheter removal documentation on 2/24/2021:    Aarti Clemons presents to the clinic for catheter removal.  Reason for removal: urinary retention  Order has been verified.  Catheter successfully removed at 4:25 PM without immediate complication.  400 cc's of urine present in the catheter bag.  Urethral meatus is free of secretions and encrustation.  The patient is afebrile.  The patient tolerated the procedure and was instructed to watch for signs of infection and prepped for procedure.  Prior to the start of the procedure and with procedural staff participation, I verbally confirmed the patient s identity using two indicators, relevant allergies, that the procedure was appropriate and matched the consent or emergent situation, and that the correct equipment/implants were available. Immediately prior to starting the procedure I conducted the Time Out with the procedural staff and re-confirmed the patient s name, procedure, and site/side. I have wiped the patient off with the povidone-Iodine solution, draped them,  and instilled sterile water into the bladder. (The Joint Commission universal protocol was followed.)  Yes    Sedation (Moderate or Deep): None  post void residual 53    CHASIDY Stringer LPN  2/24/2021  4:23 PM       "

## 2021-02-24 NOTE — LETTER
Date:March 14, 2021      Patient was self referred, no letter generated. Do not send.        [facility] Health Information

## 2021-02-24 NOTE — LETTER
2/24/2021       RE: Aarti Clemons  [address] [facility] [address]     Dear Colleague,    Thank you for referring your patient, Aarti Clemons, to the [facility] at [facility]. Please see a copy of my visit note below.    CYSTOSCOPY PROCEDURE NOTE:    Aarti Clemons is a 35 year old female who presents with urinary retention for a cystoscopy.    Seen in the ER on 2/2/21:  Aarti Clemons is a 35 year old female who is being seen for evaluation of urinary retention.  She notes that she has been having increased difficulty with urination for the past 7 months.  She notes that she has been having increased small volume voids with a sensation of bladder fullness and at times some bladder pain and pelvic pain.  She was previously seen in October in an urgent care for possible UTI which was negative.  Her symptoms worsened over the weekend and she was seen again in urgent care setting with possible UTI and started on Bactrim was also given a prescription for Ditropan for urinary frequency.  The past few days she has had worsening increased difficulty urinating with really only small drips of urine passing through the urethra.     She denies any prior vaginal surgery.  She has 4 children delivered vaginally.  She does not recall any significant vaginal lacerations or urethral lacerations which required surgical intervention at the time.  Her youngest child is 4 years old.     CT scan obtained in the emergency room demonstrates significantly distended bladder.  Emergency room RN staff unable to successfully pass urethra into the bladder with resistance felt about 2 cm into the urethral meatus.     Lynn catheter placed    Pt ID verified with patient: Yes     Procedure verified with patient: Yes     Procedure confirmed with physician and support staff: Yes     Consent confirmed with physician and support  staff.    Sign In:  History and Physical Exam reviewed   Primary Diagnosis: urinary retention   Informed Consent Discussed: Yes   Sign in Communication: Yes   Time Out:  Team Confirms the Correct Patient, Correct Procedure; Yes , Correct Site and Site Marking, Correct Position (if applicable).  Affirmation of Time Out: Yes   Sign Out:  Sign Out Discussion: Yes   Physician: Jose Palomo MD  Indications for procedure:   Aarti Clemons is a 35 year old female with a history of urinary retention.    Description of procedure:   After fully informed, voluntary consent was obtained, the patient was brought into the procedure room, identified and placed in a dorsal lithotomy position on the cystoscopy table.  The vagina/introitus were prepped with betadine and draped in a sterile fashion.  Urojet lidocaine gel was introduced.  A 15F flexible cystoscope was inserted into the urethra, and the bladder and urethra were examined in a systematic manner.  The patient tolerated the procedure well and there were no complications.      Findings:  External exam revealed no cystocele and no rectocele.   Cystoscopy then showed the urethra to be normal in appearance with normal coaptation. The bladder itself was completely surveyed.  The ureteric orifices were normal in position and number and effluxing clear urine.  There was no trabeculation.  There were no neoplasms, stones, or diverticula identifed.      Assessment/Plan:   Aarti Clemons is a 35 year old female with a history of urinary retention , now with normal findings on cystoscopy.      Urinary retention  -She was seen and examined today also with Dr. Caraballo  -Her post cystoscopy trial of void was successful with a PVR of 50  -We discussed that it is unclear the etiology of her urinary retention, however there does not seem to be any concerning abnormalities on cystoscopy or physical exam  -Plan for pelvic floor physical therapy  -Follow-up with Dr. Caraballo in 6  months    Jose Palomo MD       Again, thank you for allowing me to participate in the care of your patient.      Sincerely,    Jose Palomo MD

## 2021-02-24 NOTE — PATIENT INSTRUCTIONS
"AFTER YOUR CYSTOSCOPY  ?  ?  You have just completed a cystoscopy, or \"cysto\", which allowed your physician to learn more about your bladder (or to remove a stent placed after surgery). We suggest that you continue to avoid caffeine, fruit juice, and alcohol for the next 24 hours, however, you are encouraged to return to your normal activities.  ?  ?  A few things that are considered normal after your cystoscopy:  ?  * small amount of bleeding (or spotting) that clears within the next 24 hours  ?  * slight burning sensation with urination  ?  * sensation of needing to void (urinate) more frequently  ?  * the feeling of \"air\" in your urine  ?  * mild discomfort that is relieved with Tylenol    * bladder spasms  ?  ?  ?  Please contact our office promptly if you:  ?  * develop a fever above 101 degrees  ?  * are unable to urinate  ?  * develop bright red blood that does not stop  ?  * experience severe pain or swelling  ?  ?  ?  And of course, please contact our office with any concerns or questions [phone]  ?    AFTER YOUR CYSTOSCOPY        You have just completed a cystoscopy, or \"cysto\", which allowed your physician to learn more about your bladder (or to remove a stent placed after surgery). We suggest that you continue to avoid caffeine, fruit juice, and alcohol for the next 24 hours, however, you are encouraged to return to your normal activities.         A few things that are considered normal after your cystoscopy:     * Small amount of bleeding (or spotting) that clears within the next 24 hours     * Slight burning sensation with urination     * Sensation to of needing to avoid more frequently     * The feeling of \"air\" in your urine     * Mild discomfort that is relieved with Tylenol        Please contact our office promptly if you:     * Develop a fever above 101 degrees     * Are unable to urinate     * Develop bright red blood that does not stop     * Severe pain or swelling         Please contact " our office with any concerns or questions @[facility].

## 2021-02-25 ENCOUNTER — APPOINTMENT (OUTPATIENT)
Dept: INTERPRETER SERVICES | Facility: CLINIC | Age: 36
End: 2021-02-25
Payer: MEDICAID

## 2021-03-08 NOTE — PROGRESS NOTES
CYSTOSCOPY PROCEDURE NOTE:    Aarti Clemons is a 35 year old female who presents with urinary retention for a cystoscopy.    Seen in the ER on 2/2/21:  Aarti Clemons is a 35 year old female who is being seen for evaluation of urinary retention.  She notes that she has been having increased difficulty with urination for the past 7 months.  She notes that she has been having increased small volume voids with a sensation of bladder fullness and at times some bladder pain and pelvic pain.  She was previously seen in October in an urgent care for possible UTI which was negative.  Her symptoms worsened over the weekend and she was seen again in urgent care setting with possible UTI and started on Bactrim was also given a prescription for Ditropan for urinary frequency.  The past few days she has had worsening increased difficulty urinating with really only small drips of urine passing through the urethra.     She denies any prior vaginal surgery.  She has 4 children delivered vaginally.  She does not recall any significant vaginal lacerations or urethral lacerations which required surgical intervention at the time.  Her youngest child is 4 years old.     CT scan obtained in the emergency room demonstrates significantly distended bladder.  Emergency room RN staff unable to successfully pass urethra into the bladder with resistance felt about 2 cm into the urethral meatus.     Lynn catheter placed    Pt ID verified with patient: Yes     Procedure verified with patient: Yes     Procedure confirmed with physician and support staff: Yes     Consent confirmed with physician and support staff.    Sign In:  History and Physical Exam reviewed   Primary Diagnosis: urinary retention   Informed Consent Discussed: Yes   Sign in Communication: Yes   Time Out:  Team Confirms the Correct Patient, Correct Procedure; Yes , Correct Site and Site Marking, Correct Position (if applicable).  Affirmation of Time Out: Yes    Sign Out:  Sign Out Discussion: Yes   Physician: Jose Palomo MD  Indications for procedure:   Aarti Clemons is a 35 year old female with a history of urinary retention.    Description of procedure:   After fully informed, voluntary consent was obtained, the patient was brought into the procedure room, identified and placed in a dorsal lithotomy position on the cystoscopy table.  The vagina/introitus were prepped with betadine and draped in a sterile fashion.  Urojet lidocaine gel was introduced.  A 15F flexible cystoscope was inserted into the urethra, and the bladder and urethra were examined in a systematic manner.  The patient tolerated the procedure well and there were no complications.      Findings:  External exam revealed no cystocele and no rectocele.   Cystoscopy then showed the urethra to be normal in appearance with normal coaptation. The bladder itself was completely surveyed.  The ureteric orifices were normal in position and number and effluxing clear urine.  There was no trabeculation.  There were no neoplasms, stones, or diverticula identifed.      Assessment/Plan:   Aarti Clemons is a 35 year old female with a history of urinary retention , now with normal findings on cystoscopy.      Urinary retention  -She was seen and examined today also with Dr. Caraballo  -Her post cystoscopy trial of void was successful with a PVR of 50  -We discussed that it is unclear the etiology of her urinary retention, however there does not seem to be any concerning abnormalities on cystoscopy or physical exam  -Plan for pelvic floor physical therapy  -Follow-up with Dr. Caraballo in 6 months    Jose Palomo MD

## 2021-05-15 ENCOUNTER — ANESTHESIA EVENT (OUTPATIENT)
Dept: SURGERY | Facility: CLINIC | Age: 36
End: 2021-05-15
Payer: COMMERCIAL

## 2021-05-15 ENCOUNTER — HOSPITAL ENCOUNTER (OUTPATIENT)
Facility: CLINIC | Age: 36
Setting detail: OBSERVATION
Discharge: HOME | End: 2021-05-15
Attending: EMERGENCY MEDICINE | Admitting: SURGERY
Payer: COMMERCIAL

## 2021-05-15 ENCOUNTER — SURGERY (OUTPATIENT)
Age: 36
End: 2021-05-15
Payer: COMMERCIAL

## 2021-05-15 ENCOUNTER — ANESTHESIA (OUTPATIENT)
Dept: SURGERY | Facility: CLINIC | Age: 36
End: 2021-05-15
Payer: COMMERCIAL

## 2021-05-15 ENCOUNTER — APPOINTMENT (OUTPATIENT)
Dept: CT IMAGING | Facility: CLINIC | Age: 36
End: 2021-05-15
Attending: EMERGENCY MEDICINE
Payer: COMMERCIAL

## 2021-05-15 VITALS
WEIGHT: 143.5 LBS | DIASTOLIC BLOOD PRESSURE: 76 MMHG | BODY MASS INDEX: 26.41 KG/M2 | HEIGHT: 62 IN | SYSTOLIC BLOOD PRESSURE: 134 MMHG | OXYGEN SATURATION: 97 % | HEART RATE: 88 BPM | TEMPERATURE: 98 F | RESPIRATION RATE: 16 BRPM

## 2021-05-15 DIAGNOSIS — K37 APPENDICITIS, UNSPECIFIED APPENDICITIS TYPE: ICD-10-CM

## 2021-05-15 DIAGNOSIS — N83.9 LESION OF OVARY: ICD-10-CM

## 2021-05-15 LAB
ALBUMIN SERPL-MCNC: 4.2 G/DL (ref 3.4–5)
ALP SERPL-CCNC: 75 U/L (ref 40–150)
ALT SERPL W P-5'-P-CCNC: 40 U/L (ref 0–50)
ANION GAP SERPL CALCULATED.3IONS-SCNC: 6 MMOL/L (ref 3–14)
AST SERPL W P-5'-P-CCNC: 18 U/L (ref 0–45)
B-HCG FREE SERPL-ACNC: <5 IU/L
BASOPHILS # BLD AUTO: 0 10E9/L (ref 0–0.2)
BASOPHILS NFR BLD AUTO: 0.2 %
BILIRUB SERPL-MCNC: 0.9 MG/DL (ref 0.2–1.3)
BUN SERPL-MCNC: 8 MG/DL (ref 7–30)
CALCIUM SERPL-MCNC: 9.2 MG/DL (ref 8.5–10.1)
CHLORIDE SERPL-SCNC: 104 MMOL/L (ref 94–109)
CO2 SERPL-SCNC: 26 MMOL/L (ref 20–32)
CREAT SERPL-MCNC: 0.62 MG/DL (ref 0.52–1.04)
DIFFERENTIAL METHOD BLD: ABNORMAL
EOSINOPHIL # BLD AUTO: 0 10E9/L (ref 0–0.7)
EOSINOPHIL NFR BLD AUTO: 0.1 %
ERYTHROCYTE [DISTWIDTH] IN BLOOD BY AUTOMATED COUNT: 12.6 % (ref 10–15)
GFR SERPL CREATININE-BSD FRML MDRD: >90 ML/MIN/{1.73_M2}
GLUCOSE SERPL-MCNC: 120 MG/DL (ref 70–99)
HCT VFR BLD AUTO: 39.4 % (ref 35–47)
HGB BLD-MCNC: 13.8 G/DL (ref 11.7–15.7)
IMM GRANULOCYTES # BLD: 0.1 10E9/L (ref 0–0.4)
IMM GRANULOCYTES NFR BLD: 0.3 %
LABORATORY COMMENT REPORT: NORMAL
LIPASE SERPL-CCNC: 77 U/L (ref 73–393)
LYMPHOCYTES # BLD AUTO: 1.7 10E9/L (ref 0.8–5.3)
LYMPHOCYTES NFR BLD AUTO: 11.5 %
MCH RBC QN AUTO: 31.4 PG (ref 26.5–33)
MCHC RBC AUTO-ENTMCNC: 35 G/DL (ref 31.5–36.5)
MCV RBC AUTO: 90 FL (ref 78–100)
MONOCYTES # BLD AUTO: 0.4 10E9/L (ref 0–1.3)
MONOCYTES NFR BLD AUTO: 2.8 %
NEUTROPHILS # BLD AUTO: 12.3 10E9/L (ref 1.6–8.3)
NEUTROPHILS NFR BLD AUTO: 85.1 %
NRBC # BLD AUTO: 0 10*3/UL
NRBC BLD AUTO-RTO: 0 /100
PLATELET # BLD AUTO: 390 10E9/L (ref 150–450)
POTASSIUM SERPL-SCNC: 3.4 MMOL/L (ref 3.4–5.3)
PROT SERPL-MCNC: 7.8 G/DL (ref 6.8–8.8)
RBC # BLD AUTO: 4.39 10E12/L (ref 3.8–5.2)
SARS-COV-2 RNA RESP QL NAA+PROBE: NEGATIVE
SODIUM SERPL-SCNC: 136 MMOL/L (ref 133–144)
SPECIMEN SOURCE: NORMAL
WBC # BLD AUTO: 14.5 10E9/L (ref 4–11)

## 2021-05-15 PROCEDURE — 999N000141 HC STATISTIC PRE-PROCEDURE NURSING ASSESSMENT: Performed by: SURGERY

## 2021-05-15 PROCEDURE — 96376 TX/PRO/DX INJ SAME DRUG ADON: CPT | Mod: XU

## 2021-05-15 PROCEDURE — 88304 TISSUE EXAM BY PATHOLOGIST: CPT | Mod: TC | Performed by: SURGERY

## 2021-05-15 PROCEDURE — 96365 THER/PROPH/DIAG IV INF INIT: CPT

## 2021-05-15 PROCEDURE — 96361 HYDRATE IV INFUSION ADD-ON: CPT

## 2021-05-15 PROCEDURE — 710N000010 HC RECOVERY PHASE 1, LEVEL 2, PER MIN: Performed by: SURGERY

## 2021-05-15 PROCEDURE — 258N000003 HC RX IP 258 OP 636: Performed by: SURGERY

## 2021-05-15 PROCEDURE — 96376 TX/PRO/DX INJ SAME DRUG ADON: CPT

## 2021-05-15 PROCEDURE — 250N000011 HC RX IP 250 OP 636: Performed by: NURSE ANESTHETIST, CERTIFIED REGISTERED

## 2021-05-15 PROCEDURE — 80053 COMPREHEN METABOLIC PANEL: CPT | Performed by: EMERGENCY MEDICINE

## 2021-05-15 PROCEDURE — 250N000011 HC RX IP 250 OP 636: Performed by: EMERGENCY MEDICINE

## 2021-05-15 PROCEDURE — 74177 CT ABD & PELVIS W/CONTRAST: CPT

## 2021-05-15 PROCEDURE — 710N000012 HC RECOVERY PHASE 2, PER MINUTE: Performed by: SURGERY

## 2021-05-15 PROCEDURE — G0378 HOSPITAL OBSERVATION PER HR: HCPCS

## 2021-05-15 PROCEDURE — 272N000001 HC OR GENERAL SUPPLY STERILE: Performed by: SURGERY

## 2021-05-15 PROCEDURE — 85025 COMPLETE CBC W/AUTO DIFF WBC: CPT | Performed by: EMERGENCY MEDICINE

## 2021-05-15 PROCEDURE — 99285 EMERGENCY DEPT VISIT HI MDM: CPT | Mod: 25

## 2021-05-15 PROCEDURE — 258N000003 HC RX IP 258 OP 636: Performed by: ANESTHESIOLOGY

## 2021-05-15 PROCEDURE — 44970 LAPAROSCOPY APPENDECTOMY: CPT | Performed by: SURGERY

## 2021-05-15 PROCEDURE — 250N000009 HC RX 250: Performed by: NURSE ANESTHETIST, CERTIFIED REGISTERED

## 2021-05-15 PROCEDURE — 250N000013 HC RX MED GY IP 250 OP 250 PS 637: Performed by: SURGERY

## 2021-05-15 PROCEDURE — 93005 ELECTROCARDIOGRAM TRACING: CPT

## 2021-05-15 PROCEDURE — 87635 SARS-COV-2 COVID-19 AMP PRB: CPT | Performed by: EMERGENCY MEDICINE

## 2021-05-15 PROCEDURE — 360N000076 HC SURGERY LEVEL 3, PER MIN: Performed by: SURGERY

## 2021-05-15 PROCEDURE — 88304 TISSUE EXAM BY PATHOLOGIST: CPT | Mod: 26

## 2021-05-15 PROCEDURE — 99204 OFFICE O/P NEW MOD 45 MIN: CPT | Mod: 57 | Performed by: SURGERY

## 2021-05-15 PROCEDURE — 96375 TX/PRO/DX INJ NEW DRUG ADDON: CPT

## 2021-05-15 PROCEDURE — 250N000011 HC RX IP 250 OP 636: Performed by: SURGERY

## 2021-05-15 PROCEDURE — C9803 HOPD COVID-19 SPEC COLLECT: HCPCS

## 2021-05-15 PROCEDURE — 370N000017 HC ANESTHESIA TECHNICAL FEE, PER MIN: Performed by: SURGERY

## 2021-05-15 PROCEDURE — 84702 CHORIONIC GONADOTROPIN TEST: CPT

## 2021-05-15 PROCEDURE — 250N000009 HC RX 250: Performed by: SURGERY

## 2021-05-15 PROCEDURE — 250N000011 HC RX IP 250 OP 636: Performed by: ANESTHESIOLOGY

## 2021-05-15 PROCEDURE — 83690 ASSAY OF LIPASE: CPT | Performed by: EMERGENCY MEDICINE

## 2021-05-15 PROCEDURE — 258N000003 HC RX IP 258 OP 636: Performed by: EMERGENCY MEDICINE

## 2021-05-15 PROCEDURE — 250N000009 HC RX 250: Performed by: EMERGENCY MEDICINE

## 2021-05-15 RX ORDER — FENTANYL CITRATE 50 UG/ML
25-50 INJECTION, SOLUTION INTRAMUSCULAR; INTRAVENOUS
Status: DISCONTINUED | OUTPATIENT
Start: 2021-05-15 | End: 2021-05-15 | Stop reason: HOSPADM

## 2021-05-15 RX ORDER — ONDANSETRON 2 MG/ML
4 INJECTION INTRAMUSCULAR; INTRAVENOUS EVERY 30 MIN PRN
Status: DISCONTINUED | OUTPATIENT
Start: 2021-05-15 | End: 2021-05-15 | Stop reason: HOSPADM

## 2021-05-15 RX ORDER — OXYCODONE HYDROCHLORIDE 5 MG/1
5-10 TABLET ORAL EVERY 4 HOURS PRN
Qty: 20 TABLET | Refills: 0 | Status: SHIPPED | OUTPATIENT
Start: 2021-05-15

## 2021-05-15 RX ORDER — HYDROMORPHONE HYDROCHLORIDE 1 MG/ML
0.3 INJECTION, SOLUTION INTRAMUSCULAR; INTRAVENOUS; SUBCUTANEOUS
Status: DISCONTINUED | OUTPATIENT
Start: 2021-05-15 | End: 2021-05-15 | Stop reason: HOSPADM

## 2021-05-15 RX ORDER — NALOXONE HYDROCHLORIDE 0.4 MG/ML
0.2 INJECTION, SOLUTION INTRAMUSCULAR; INTRAVENOUS; SUBCUTANEOUS
Status: DISCONTINUED | OUTPATIENT
Start: 2021-05-15 | End: 2021-05-15 | Stop reason: HOSPADM

## 2021-05-15 RX ORDER — HYDROMORPHONE HYDROCHLORIDE 1 MG/ML
0.5 INJECTION, SOLUTION INTRAMUSCULAR; INTRAVENOUS; SUBCUTANEOUS ONCE
Status: COMPLETED | OUTPATIENT
Start: 2021-05-15 | End: 2021-05-15

## 2021-05-15 RX ORDER — NALOXONE HYDROCHLORIDE 0.4 MG/ML
0.4 INJECTION, SOLUTION INTRAMUSCULAR; INTRAVENOUS; SUBCUTANEOUS
Status: DISCONTINUED | OUTPATIENT
Start: 2021-05-15 | End: 2021-05-15 | Stop reason: HOSPADM

## 2021-05-15 RX ORDER — FENTANYL CITRATE 50 UG/ML
INJECTION, SOLUTION INTRAMUSCULAR; INTRAVENOUS PRN
Status: DISCONTINUED | OUTPATIENT
Start: 2021-05-15 | End: 2021-05-15

## 2021-05-15 RX ORDER — BUPIVACAINE HYDROCHLORIDE 5 MG/ML
INJECTION, SOLUTION EPIDURAL; INTRACAUDAL PRN
Status: DISCONTINUED | OUTPATIENT
Start: 2021-05-15 | End: 2021-05-15 | Stop reason: HOSPADM

## 2021-05-15 RX ORDER — LABETALOL HYDROCHLORIDE 5 MG/ML
10 INJECTION, SOLUTION INTRAVENOUS EVERY 5 MIN PRN
Status: DISCONTINUED | OUTPATIENT
Start: 2021-05-15 | End: 2021-05-15 | Stop reason: HOSPADM

## 2021-05-15 RX ORDER — SODIUM CHLORIDE, SODIUM LACTATE, POTASSIUM CHLORIDE, CALCIUM CHLORIDE 600; 310; 30; 20 MG/100ML; MG/100ML; MG/100ML; MG/100ML
INJECTION, SOLUTION INTRAVENOUS CONTINUOUS
Status: DISCONTINUED | OUTPATIENT
Start: 2021-05-15 | End: 2021-05-15 | Stop reason: HOSPADM

## 2021-05-15 RX ORDER — ONDANSETRON 2 MG/ML
4 INJECTION INTRAMUSCULAR; INTRAVENOUS ONCE
Status: COMPLETED | OUTPATIENT
Start: 2021-05-15 | End: 2021-05-15

## 2021-05-15 RX ORDER — MEPERIDINE HYDROCHLORIDE 25 MG/ML
12.5 INJECTION INTRAMUSCULAR; INTRAVENOUS; SUBCUTANEOUS
Status: DISCONTINUED | OUTPATIENT
Start: 2021-05-15 | End: 2021-05-15 | Stop reason: HOSPADM

## 2021-05-15 RX ORDER — ONDANSETRON 4 MG/1
4-8 TABLET, ORALLY DISINTEGRATING ORAL EVERY 8 HOURS PRN
Qty: 8 TABLET | Refills: 0 | Status: SHIPPED | OUTPATIENT
Start: 2021-05-15

## 2021-05-15 RX ORDER — PROPOFOL 10 MG/ML
INJECTION, EMULSION INTRAVENOUS PRN
Status: DISCONTINUED | OUTPATIENT
Start: 2021-05-15 | End: 2021-05-15

## 2021-05-15 RX ORDER — IOPAMIDOL 755 MG/ML
500 INJECTION, SOLUTION INTRAVASCULAR ONCE
Status: COMPLETED | OUTPATIENT
Start: 2021-05-15 | End: 2021-05-15

## 2021-05-15 RX ORDER — ONDANSETRON 4 MG/1
4 TABLET, ORALLY DISINTEGRATING ORAL EVERY 30 MIN PRN
Status: DISCONTINUED | OUTPATIENT
Start: 2021-05-15 | End: 2021-05-15 | Stop reason: HOSPADM

## 2021-05-15 RX ORDER — ONDANSETRON 2 MG/ML
4 INJECTION INTRAMUSCULAR; INTRAVENOUS EVERY 6 HOURS PRN
Status: DISCONTINUED | OUTPATIENT
Start: 2021-05-15 | End: 2021-05-15 | Stop reason: HOSPADM

## 2021-05-15 RX ORDER — PIPERACILLIN SODIUM, TAZOBACTAM SODIUM 3; .375 G/15ML; G/15ML
INJECTION, POWDER, LYOPHILIZED, FOR SOLUTION INTRAVENOUS PRN
Status: DISCONTINUED | OUTPATIENT
Start: 2021-05-15 | End: 2021-05-15

## 2021-05-15 RX ORDER — ONDANSETRON 2 MG/ML
INJECTION INTRAMUSCULAR; INTRAVENOUS PRN
Status: DISCONTINUED | OUTPATIENT
Start: 2021-05-15 | End: 2021-05-15

## 2021-05-15 RX ORDER — SODIUM CHLORIDE 9 MG/ML
INJECTION, SOLUTION INTRAVENOUS CONTINUOUS
Status: DISCONTINUED | OUTPATIENT
Start: 2021-05-15 | End: 2021-05-15 | Stop reason: HOSPADM

## 2021-05-15 RX ORDER — OXYCODONE HYDROCHLORIDE 5 MG/1
5 TABLET ORAL
Status: COMPLETED | OUTPATIENT
Start: 2021-05-15 | End: 2021-05-15

## 2021-05-15 RX ORDER — DEXAMETHASONE SODIUM PHOSPHATE 4 MG/ML
INJECTION, SOLUTION INTRA-ARTICULAR; INTRALESIONAL; INTRAMUSCULAR; INTRAVENOUS; SOFT TISSUE PRN
Status: DISCONTINUED | OUTPATIENT
Start: 2021-05-15 | End: 2021-05-15

## 2021-05-15 RX ORDER — GLYCOPYRROLATE 0.2 MG/ML
INJECTION, SOLUTION INTRAMUSCULAR; INTRAVENOUS PRN
Status: DISCONTINUED | OUTPATIENT
Start: 2021-05-15 | End: 2021-05-15

## 2021-05-15 RX ORDER — HYDROMORPHONE HYDROCHLORIDE 1 MG/ML
.3-.5 INJECTION, SOLUTION INTRAMUSCULAR; INTRAVENOUS; SUBCUTANEOUS EVERY 5 MIN PRN
Status: DISCONTINUED | OUTPATIENT
Start: 2021-05-15 | End: 2021-05-15 | Stop reason: HOSPADM

## 2021-05-15 RX ORDER — NEOSTIGMINE METHYLSULFATE 1 MG/ML
VIAL (ML) INJECTION PRN
Status: DISCONTINUED | OUTPATIENT
Start: 2021-05-15 | End: 2021-05-15

## 2021-05-15 RX ORDER — LIDOCAINE HYDROCHLORIDE 10 MG/ML
INJECTION, SOLUTION INFILTRATION; PERINEURAL PRN
Status: DISCONTINUED | OUTPATIENT
Start: 2021-05-15 | End: 2021-05-15

## 2021-05-15 RX ADMIN — SODIUM CHLORIDE: 9 INJECTION, SOLUTION INTRAVENOUS at 13:33

## 2021-05-15 RX ADMIN — ONDANSETRON HYDROCHLORIDE 4 MG: 2 INJECTION, SOLUTION INTRAVENOUS at 16:02

## 2021-05-15 RX ADMIN — FENTANYL CITRATE 50 MCG: 50 INJECTION, SOLUTION INTRAMUSCULAR; INTRAVENOUS at 17:00

## 2021-05-15 RX ADMIN — FENTANYL CITRATE 50 MCG: 50 INJECTION, SOLUTION INTRAMUSCULAR; INTRAVENOUS at 15:21

## 2021-05-15 RX ADMIN — SODIUM CHLORIDE 57 ML: 9 INJECTION, SOLUTION INTRAVENOUS at 09:29

## 2021-05-15 RX ADMIN — HYDROMORPHONE HYDROCHLORIDE 0.5 MG: 1 INJECTION, SOLUTION INTRAMUSCULAR; INTRAVENOUS; SUBCUTANEOUS at 10:38

## 2021-05-15 RX ADMIN — SODIUM CHLORIDE 1000 ML: 9 INJECTION, SOLUTION INTRAVENOUS at 09:05

## 2021-05-15 RX ADMIN — FENTANYL CITRATE 50 MCG: 50 INJECTION, SOLUTION INTRAMUSCULAR; INTRAVENOUS at 15:25

## 2021-05-15 RX ADMIN — ONDANSETRON 4 MG: 2 INJECTION INTRAMUSCULAR; INTRAVENOUS at 17:37

## 2021-05-15 RX ADMIN — FAMOTIDINE 20 MG: 10 INJECTION, SOLUTION INTRAVENOUS at 09:08

## 2021-05-15 RX ADMIN — SODIUM CHLORIDE, POTASSIUM CHLORIDE, SODIUM LACTATE AND CALCIUM CHLORIDE: 600; 310; 30; 20 INJECTION, SOLUTION INTRAVENOUS at 15:19

## 2021-05-15 RX ADMIN — OXYCODONE HYDROCHLORIDE 5 MG: 5 TABLET ORAL at 17:45

## 2021-05-15 RX ADMIN — DEXAMETHASONE SODIUM PHOSPHATE 4 MG: 4 INJECTION, SOLUTION INTRA-ARTICULAR; INTRALESIONAL; INTRAMUSCULAR; INTRAVENOUS; SOFT TISSUE at 15:26

## 2021-05-15 RX ADMIN — PROPOFOL 200 MG: 10 INJECTION, EMULSION INTRAVENOUS at 15:25

## 2021-05-15 RX ADMIN — BUPIVACAINE HYDROCHLORIDE 10 ML: 5 INJECTION, SOLUTION EPIDURAL; INTRACAUDAL at 16:09

## 2021-05-15 RX ADMIN — TAZOBACTAM SODIUM AND PIPERACILLIN SODIUM 4.5 G: 500; 4 INJECTION, SOLUTION INTRAVENOUS at 10:40

## 2021-05-15 RX ADMIN — HYDROMORPHONE HYDROCHLORIDE 0.3 MG: 1 INJECTION, SOLUTION INTRAMUSCULAR; INTRAVENOUS; SUBCUTANEOUS at 13:29

## 2021-05-15 RX ADMIN — FENTANYL CITRATE 50 MCG: 50 INJECTION, SOLUTION INTRAMUSCULAR; INTRAVENOUS at 16:48

## 2021-05-15 RX ADMIN — ROCURONIUM BROMIDE 50 MG: 10 INJECTION INTRAVENOUS at 15:26

## 2021-05-15 RX ADMIN — IOPAMIDOL 68 ML: 755 INJECTION, SOLUTION INTRAVENOUS at 09:29

## 2021-05-15 RX ADMIN — HYDROMORPHONE HYDROCHLORIDE 0.5 MG: 1 INJECTION, SOLUTION INTRAMUSCULAR; INTRAVENOUS; SUBCUTANEOUS at 11:58

## 2021-05-15 RX ADMIN — GLYCOPYRROLATE 0.6 MG: 0.2 INJECTION, SOLUTION INTRAMUSCULAR; INTRAVENOUS at 16:09

## 2021-05-15 RX ADMIN — ONDANSETRON 4 MG: 2 INJECTION INTRAMUSCULAR; INTRAVENOUS at 09:05

## 2021-05-15 RX ADMIN — HYDROMORPHONE HYDROCHLORIDE 0.5 MG: 1 INJECTION, SOLUTION INTRAMUSCULAR; INTRAVENOUS; SUBCUTANEOUS at 16:47

## 2021-05-15 RX ADMIN — NEOSTIGMINE METHYLSULFATE 4 MG: 1 INJECTION, SOLUTION INTRAVENOUS at 16:09

## 2021-05-15 RX ADMIN — PIPERACILLIN AND TAZOBACTAM 3.38 G: 3; .375 INJECTION, POWDER, FOR SOLUTION INTRAVENOUS at 15:29

## 2021-05-15 RX ADMIN — LIDOCAINE HYDROCHLORIDE 50 MG: 10 INJECTION, SOLUTION INFILTRATION; PERINEURAL at 15:25

## 2021-05-15 RX ADMIN — HYDROMORPHONE HYDROCHLORIDE 0.5 MG: 1 INJECTION, SOLUTION INTRAMUSCULAR; INTRAVENOUS; SUBCUTANEOUS at 09:08

## 2021-05-15 RX ADMIN — ONDANSETRON 4 MG: 2 INJECTION INTRAMUSCULAR; INTRAVENOUS at 10:41

## 2021-05-15 ASSESSMENT — ENCOUNTER SYMPTOMS
NAUSEA: 1
FREQUENCY: 0
DYSURIA: 0
SHORTNESS OF BREATH: 0
DIARRHEA: 0
VOMITING: 1
ABDOMINAL PAIN: 1

## 2021-05-15 ASSESSMENT — MIFFLIN-ST. JEOR: SCORE: 1304.16

## 2021-05-15 NOTE — OP NOTE
General Surgery Operative Note    Pre-operative diagnosis:  Appendicitis [K37]   Post-operative diagnosis: same   Procedure: Laparoscopic Appendectomy   Surgeon: Servando Cary MD   Assistant(s): NONE   Anesthesia: General    Estimated blood loss: 5 cc's   Drains placed: None   Complications:  None   Findings:   Obviously inflamed appendix without evidence of perforation.     Indications for operation: This is a 35-year-old woman who presents with a several hour history of progressive right lower quadrant pain.  CT scan in the emergency room showed findings consistent with acute appendicitis.  Laparoscopic appendectomy was recommended, and the procedure, along with its risks and complications, was discussed with the patient.  She agreed to proceed.    Details of the operation: After informed consent, the patient was taken to the operating room where she underwent satisfactory induction of general anesthesia.  The patient was sterilely prepped and draped and a supraumbilical skin incision was made.  Dissection was carried bluntly down to the fascia, which was opened using electrocautery.  The Feng trocar was introduced and fixed in place using stay sutures.  Pneumoperitoneum was achieved using CO2 insufflation and, under direct visualization, two 5 mm lower abdominal ports were placed.  The patient was placed in Trendelenburg position and the appendix was identified in the right lower quadrant.  Some peritoneal attachments were divided using electrocautery.  The base of the appendix was identified.  The appendix itself was very inflamed and somewhat hemorrhagic in appearance.  There is no evidence of perforation.  A window was made in the mesoappendix and an Endo PATRICIA stapler was fired across the cecum just below the base of the appendix.  A vascular load was used to divide the mesoappendix.  The appendix was placed in an Endo Catch bag and it was then brought out through the supraumbilical incision without  difficulty.  The Feng trocar was reintroduced and the abdomen was irrigated out using normal saline.  There was excellent hemostasis.  The staple lines were intact.  The trocar sites were now infiltrated with 0.5% Marcaine and the trochars were removed under direct visualization.  The supraumbilical fascia was closed using interrupted 0 Vicryl sutures and the skin incisions were closed using 4-0 subcuticular Vicryl followed by Steri-Strips.    The patient tolerated the procedure well and was transferred to the recovery room in satisfactory condition.  Sponge and needle counts were correct at the close of the case.    Specimens:   ID Type Source Tests Collected by Time Destination   A : APPENDIX Tissue Appendix SURGICAL PATHOLOGY EXAM Servando Cary MD 5/15/2021  3:57 PM            Servando Cary MD

## 2021-05-15 NOTE — PLAN OF CARE
ROOM # 233    Living Situation (if not independent, order SW consult): Independent  Facility name:  : [facility], [phone]    Activity level at baseline: Ind  Activity level on admit: Ind      Patient registered to observation; given Patient Bill of Rights; given the opportunity to ask questions about observation status and their plan of care.  Patient has been oriented to the observation room, bathroom and call light is in place.    Discussed discharge goals and expectations with patient/family.

## 2021-05-15 NOTE — ED PROVIDER NOTES
History   Chief Complaint:  Abdominal Pain       HPI   Aarti Clemons is a 35 year old female presenting with abdominal pain.  Patient states she woke up at around 430 with sharp periumbilical abdominal pain that has radiated throughout her abdomen.  She describes the pain is constant without exacerbating or relieving symptoms though states it is increasing in intensity.  She reports accompanying nausea and vomiting.  She denies any fever, chills, cough, chest pain, dysuria, diarrhea or vaginal complaints.  No reported sick contacts or suspicious foods.  She has received 1 COVID-19 vaccination.  She tried taking Tylenol at home for symptom relief though states she vomited thereafter.  No reported alcohol, chronic NSAIDs or abdominal surgeries.    Review of Systems   Respiratory: Negative for shortness of breath.    Cardiovascular: Negative for chest pain.   Gastrointestinal: Positive for abdominal pain, nausea and vomiting. Negative for diarrhea.   Genitourinary: Negative for dysuria and frequency.   All other systems reviewed and are negative.      Allergies:  The patient has no known allergies.     Medications:  Denies    Past Medical History:    Denies     Past Surgical History:    Denies    Family History:    Denies    Social History:  Denies smoking/ETOH use  Presents with     Physical Exam     Patient Vitals for the past 24 hrs:   BP Temp Temp src Pulse Resp SpO2   05/15/21 1100 (!) 132/95 -- -- 87 -- --   05/15/21 1045 (!) 130/104 -- -- -- -- --   05/15/21 1015 128/88 -- -- -- -- --   05/15/21 1000 117/85 -- -- 88 -- --   05/15/21 0920 126/89 -- -- 79 -- 93 %   05/15/21 0900 (!) 139/95 -- -- 78 -- 100 %   05/15/21 0811 (!) 141/102 97.3  F (36.3  C) Oral 87 20 100 %       Physical Exam  Nursing note and vitals reviewed.  Constitutional: Well nourished.   Eyes: Conjunctiva normal.  Pupils are equal, round, and reactive to light.   ENT: Nose normal. Mucous membranes pink and moist.    Neck:  Normal range of motion.  CVS: Normal rate, regular rhythm.  Normal heart sounds.  No murmur.  Pulmonary: Lungs clear to auscultation bilaterally. No wheezes/rales/rhonchi.  GI: Abdomen soft. Mild generalized tenderness, greatest in periumbilical region and RLQ. No rigidity or guarding.  No CVA tenderness  MSK: No calf tenderness or swelling.  Neuro: Alert. Follows simple commands.  Skin: Skin is warm and dry. No rash noted.   Psychiatric: Normal affect.     Emergency Department Course   ECG  ECG taken at 0844, ECG read at 52928  Normal sinus rhythm\  Normal EKG  Rate 82 bpm. ME interval 156 ms. QRS duration 76 ms. QT/QTc 376/439 ms. P-R-T axes 56 74 35.     Imaging:  Abd/pelvis CT,  IV  contrast only TRAUMA / AAA    (Results Pending)       Laboratory:  Labs Ordered and Resulted from Time of ED Arrival Up to the Time of Departure from the ED   CBC WITH PLATELETS DIFFERENTIAL - Abnormal; Notable for the following components:       Result Value    WBC 14.5 (*)     Absolute Neutrophil 12.3 (*)     All other components within normal limits   COMPREHENSIVE METABOLIC PANEL - Abnormal; Notable for the following components:    Glucose 120 (*)     All other components within normal limits   LIPASE   SARS-COV-2 (COVID-19) VIRUS RT-PCR   ISTAT HCG QUANTITATIVE PREGNANCY NURSING POCT   ISTAT HCG QUANTITATIVE PREGNANCY POCT       Emergency Department Course:    Reviewed:  I reviewed nursing notes, vitals and past medical history    Assessments:  I obtained history and examined the patient as noted above.   I rechecked the patient and explained findings.     Consults:   Dr. Shahriar Cary, general surgery    Interventions:  Medications   famotidine (PEPCID) injection 20 mg (20 mg Intravenous Given 5/15/21 0908)   ondansetron (ZOFRAN) injection 4 mg (4 mg Intravenous Given 5/15/21 0905)   0.9% sodium chloride BOLUS (1,000 mLs Intravenous New Bag 5/15/21 0905)   HYDROmorphone (PF) (DILAUDID) injection 0.5 mg (0.5 mg Intravenous Given  5/15/21 0908)   CT Scan Flush (57 mLs Intravenous Given 5/15/21 0929)   iopamidol (ISOVUE-370) solution 500 mL (68 mLs Intravenous Given 5/15/21 0929)   piperacillin-tazobactam (ZOSYN) intermittent infusion 4.5 g (4.5 g Intravenous New Bag 5/15/21 1040)   HYDROmorphone (PF) (DILAUDID) injection 0.5 mg (0.5 mg Intravenous Given 5/15/21 1038)   ondansetron (ZOFRAN) injection 4 mg (4 mg Intravenous Given 5/15/21 1041)       Disposition:  The patient was admitted to the hospital under the care of Dr. Cary.       Impression & Plan     Medical Decision Making:  The patient presented with right lower quadrant abdominal pain and the CT scan confirms appendicitis.  There is no evidence of rupture or abscess at this time. Pain has been controlled with interventions in the Emergency Department.  Parenteral antibiotics have been administered in the Emergency Department. The case was discussed with the on call surgeon and the patient will be going to the operating room.  An inpatient bed has been arranged after surgery.  Incidental ovarian lesions discussed with patient, low suspicion for ovarian torsion or other serious etiology at this time. Recommended outpatient GYN f/u.       Covid-19  Aarti Clemons was evaluated during a global COVID-19 pandemic, which necessitated consideration that the patient might be at risk for infection with the SARS-CoV-2 virus that causes COVID-19.   Applicable protocols for evaluation were followed during the patient's care.   COVID-19 was considered as part of the patient's evaluation. The plan for testing is:  a test was obtained during this visit.    Diagnosis:    ICD-10-CM    1. Appendicitis, unspecified appendicitis type  K37 Symptomatic SARS-CoV-2 COVID-19 Virus (Coronavirus) by PCR   2. Lesion of ovary  N83.9        Discharge Medications:  New Prescriptions    No medications on file            Tamera Myers,   05/15/21 1129

## 2021-05-15 NOTE — ED NOTES
Applied monitoring equipment onto Patient (Pulse ox and BP). Warm blanket brought for comfort over Pt. Abdomen.

## 2021-05-15 NOTE — ANESTHESIA PROCEDURE NOTES
Airway       Patient location during procedure: OR  Staff -        Performed By: anesthesiologist  Consent for Airway        Urgency: elective  Indications and Patient Condition       Indications for airway management: lukas-procedural, airway protection and altered level of consciousness       Induction type:intravenous       Mask difficulty assessment: 1 - vent by mask    Final Airway Details       Final airway type: endotracheal airway       Successful airway: ETT - single  Endotracheal Airway Details        ETT size (mm): 7.0       Cuffed: yes       Successful intubation technique: direct laryngoscopy       DL Blade Type: Garces 2       Grade View of Cords: 1       Adjucts: stylet       Position: Center       Measured from: gums/teeth       Secured at (cm): 21       Bite block used: None    Post intubation assessment        Placement verified by: capnometry, equal breath sounds and chest rise        Number of attempts at approach: 1       Secured with: plastic tape       Ease of procedure: easy       Dentition: Intact    Medication(s) Administered   Medication Administration Time: 5/15/2021 3:28 PM

## 2021-05-15 NOTE — DISCHARGE INSTRUCTIONS
GENERAL ANESTHESIA OR SEDATION ADULT DISCHARGE INSTRUCTIONS   SPECIAL PRECAUTIONS FOR 24 HOURS AFTER SURGERY    IT IS NOT UNUSUAL TO FEEL LIGHT-HEADED OR FAINT, UP TO 24 HOURS AFTER SURGERY OR WHILE TAKING PAIN MEDICATION.  IF YOU HAVE THESE SYMPTOMS; SIT FOR A FEW MINUTES BEFORE STANDING AND HAVE SOMEONE ASSIST YOU WHEN YOU GET UP TO WALK OR USE THE BATHROOM.    YOU SHOULD REST AND RELAX FOR THE NEXT 24 HOURS AND YOU MUST MAKE ARRANGEMENTS TO HAVE SOMEONE STAY WITH YOU FOR AT LEAST 24 HOURS AFTER YOUR DISCHARGE.  AVOID HAZARDOUS AND STRENUOUS ACTIVITIES.  DO NOT MAKE IMPORTANT DECISIONS FOR 24 HOURS.    DO NOT DRIVE ANY VEHICLE OR OPERATE MECHANICAL EQUIPMENT FOR 24 HOURS FOLLOWING THE END OF YOUR SURGERY.  EVEN THOUGH YOU MAY FEEL NORMAL, YOUR REACTIONS MAY BE AFFECTED BY THE MEDICATION YOU HAVE RECEIVED.    DO NOT DRINK ALCOHOLIC BEVERAGES FOR 24 HOURS FOLLOWING YOUR SURGERY.    DRINK CLEAR LIQUIDS (APPLE JUICE, GINGER ALE, 7-UP, BROTH, ETC.).  PROGRESS TO YOUR REGULAR DIET AS YOU FEEL ABLE.    YOU MAY HAVE A DRY MOUTH, A SORE THROAT, MUSCLES ACHES OR TROUBLE SLEEPING.  THESE SHOULD GO AWAY AFTER 24 HOURS.    CALL YOUR DOCTOR FOR ANY OF THE FOLLOWING:  SIGNS OF INFECTION (FEVER, GROWING TENDERNESS AT THE SURGERY SITE, A LARGE AMOUNT OF DRAINAGE OR BLEEDING, SEVERE PAIN, FOUL-SMELLING DRAINAGE, REDNESS OR SWELLING.    IT HAS BEEN OVER 8 TO 10 HOURS SINCE SURGERY AND YOU ARE STILL NOT ABLE TO URINATE (PASS WATER).     HOME CARE FOLLOWING APPENDECTOMY  YECENIA Tejada, JOSE Cason, SANGEETHA Aranda &  GLORIA Trujillo    INCISIONAL CARE:  Replace the bandage over your incision(s) until all drainage stops, or if more comfortable to have in place.  If present, leave the steri-strips (white paper tapes) in place for 14 days after surgery.  If Dermabond (a type of skin glue) is present, leave in place until it wears/flakes off (2-3 weeks).     BATHING:  OK to shower 48 hours after surgery.  Avoid baths for  "1 week after surgery.  You may wash your hair at any time.  Gently pat your incision dry after bathing.  Do not apply lotions, creams, or ointments to incisions.    ACTIVITY:  Light Activity -- you may immediately be up and about as tolerated.  Walking is encouraged, increase as tolerated.  Driving/Light Work-- when comfortable and off narcotic pain medications.  Strenuous Work/Activity -- limit lifting to 20 pounds for 2 weeks.  Progressively increase with time.  Active Sports (running, biking, etc.) -- cautiously resume after 2 weeks.    DISCOMFORT:  Local anesthetic placed at surgery should provide relief for 4-8 hours.  Begin taking pain pills before discomfort is severe.  Take the pain medication with some food, when possible, to minimize side effects.  Intermittent use of ice packs may help during the first 1-3 weeks after surgery.  Expect gradual improvement.    Over-the-counter anti-inflammatory medications (i.e. Ibuprofen/Advil/Motrin or Naprosyn/Aleve) may be used per package instructions in addition to or while tapering off the narcotic pain medications to decrease swelling and sensitivity.  DO NOT TAKE these Anti-inflammatory medications if your primary physician has advised against doing so, or if you have acid reflux, ulcer, or bleeding disorder, or take blood-thinner medications.  Call your primary physician or the surgery office if you have medication questions.  You may have decreased energy level for 1-2 weeks after surgery related to your recovery.    DIET:  Start with liquids and gradually resume your regular diet as tolerated.  Consider eating yogurt or taking a probiotic to help your \"gut phoenix\" (good bacteria in the bowel/colon) return to normal while taking or after receiving antibiotics.  Drink plenty of fluids.  While taking pain medications, consider use of a stool softener, increase your fiber in your diet, or add a fiber supplement (like Metamucil, Citrucel) to help prevent constipation " - a possible side effect of pain medications.    NAUSEA:  If nauseated from the anesthetic/pain meds; rest in bed, get up cautiously with assistance, and drink clear liquids (juice, tea, broth).    FOLLOW-UP AFTER SURGERY:  -Our office will contact you approximately 2-3 weeks after surgery to check on your progress and answer any questions you may have.  If you are doing well, you will not need to return for an office appointment.  If any concerns are identified over the phone, we will help you make an appointment to see a provider.    -If you have not received a phone call, have any questions or concerns, or would like to be seen, please call us at [phone].  We are located at: [address], [address]; [place] [address]    -CONTACT US IF THE FOLLOWING DEVELOPS:   1. A fever that is above 101     2. Increased redness, warmth, drainage, bleeding, or swelling.   3. Pain that is not relieved by rest/ice and your prescription.   4.  Increasing pain after 48 hours.   5. Drainage that is thick, cloudy, yellow, green or white.   6. Any other questions or concerns.

## 2021-05-15 NOTE — ANESTHESIA PREPROCEDURE EVALUATION
Anesthesia Pre-Procedure Evaluation    Patient: Aarti Clemons   MRN: [id number] : 1985        Preoperative Diagnosis: Appendicitis [K37]   Procedure : Procedure(s):  APPENDECTOMY, LAPAROSCOPIC     History reviewed. No pertinent past medical history.   History reviewed. No pertinent surgical history.   No Known Allergies   Social History     Tobacco Use     Smoking status: Never Smoker     Smokeless tobacco: Never Used   Substance Use Topics     Alcohol use: Yes     Comment: socially      Wt Readings from Last 1 Encounters:   05/15/21 65.1 kg (143 lb 8 oz)        Anesthesia Evaluation   Pt has had prior anesthetic. Type: General.    No history of anesthetic complications       ROS/MED HX  ENT/Pulmonary:  - neg pulmonary ROS     Neurologic:  - neg neurologic ROS     Cardiovascular:  - neg cardiovascular ROS     METS/Exercise Tolerance:     Hematologic:  - neg hematologic  ROS     Musculoskeletal:  - neg musculoskeletal ROS     GI/Hepatic:     (+) appendicitis,     Renal/Genitourinary:  - neg Renal ROS     Endo:  - neg endo ROS     Psychiatric/Substance Use:  - neg psychiatric ROS     Infectious Disease:  - neg infectious disease ROS     Malignancy:  - neg malignancy ROS     Other:  - neg other ROS          Physical Exam    Airway        Mallampati: II   TM distance: > 3 FB   Neck ROM: full   Mouth opening: > 3 cm    Respiratory Devices and Support         Dental  no notable dental history         Cardiovascular   cardiovascular exam normal          Pulmonary   pulmonary exam normal                OUTSIDE LABS:  CBC:   Lab Results   Component Value Date    WBC 14.5 (H) 05/15/2021    WBC 8.5 2021    HGB 13.8 05/15/2021    HGB 12.8 2021    HCT 39.4 05/15/2021    HCT 38.0 2021     05/15/2021     2021     BMP:   Lab Results   Component Value Date     05/15/2021     2021    POTASSIUM 3.4 05/15/2021    POTASSIUM 4.2 2021    CHLORIDE 104  05/15/2021    CHLORIDE 107 02/07/2021    CO2 26 05/15/2021    CO2 27 02/07/2021    BUN 8 05/15/2021    BUN 8 02/07/2021    CR 0.62 05/15/2021    CR 0.70 02/07/2021     (H) 05/15/2021    GLC 95 02/07/2021     COAGS: No results found for: PTT, INR, FIBR  POC:   Lab Results   Component Value Date    HCGS Negative 02/07/2021     HEPATIC:   Lab Results   Component Value Date    ALBUMIN 4.2 05/15/2021    PROTTOTAL 7.8 05/15/2021    ALT 40 05/15/2021    AST 18 05/15/2021    ALKPHOS 75 05/15/2021    BILITOTAL 0.9 05/15/2021     OTHER:   Lab Results   Component Value Date    ANAY 9.2 05/15/2021    LIPASE 77 05/15/2021       Anesthesia Plan    ASA Status:  1   NPO Status:  NPO Appropriate    Anesthesia Type: General.     - Airway: ETT   Induction: Intravenous, Propofol.   Maintenance: Balanced.        Consents    Anesthesia Plan(s) and associated risks, benefits, and realistic alternatives discussed. Questions answered and patient/representative(s) expressed understanding.     - Discussed with:  Patient         Postoperative Care    Pain management: IV analgesics, Oral pain medications, Multi-modal analgesia.   PONV prophylaxis: Ondansetron (or other 5HT-3), Dexamethasone or Solumedrol     Comments:                Angel Martínez MD

## 2021-05-15 NOTE — ANESTHESIA POSTPROCEDURE EVALUATION
Patient: Aarti Clemons    Procedure(s):  APPENDECTOMY, LAPAROSCOPIC    Diagnosis:Appendicitis [K37]  Diagnosis Additional Information: No value filed.    Anesthesia Type:  General    Note:  Disposition: Outpatient   Postop Pain Control: Uneventful            Sign Out: Well controlled pain   PONV: No   Neuro/Psych: Uneventful            Sign Out: Acceptable/Baseline neuro status   Airway/Respiratory: Uneventful            Sign Out: Acceptable/Baseline resp. status   CV/Hemodynamics: Uneventful            Sign Out: Acceptable CV status   Other NRE: NONE   DID A NON-ROUTINE EVENT OCCUR?            Last vitals:  Vitals:    05/15/21 1645 05/15/21 1650 05/15/21 1655   BP: 119/73 115/79 124/75   Pulse: 79 74 77   Resp: 16 12 13   Temp: 97.5  F (36.4  C)     SpO2: 100% 100% 100%       Last vitals prior to Anesthesia Care Transfer:  CRNA VITALS  5/15/2021 1552 - 5/15/2021 1652      5/15/2021             Pulse:  116    SpO2:  100 %          Electronically Signed By: Angel Martínez MD  May 15, 2021  5:10 PM

## 2021-05-15 NOTE — PHARMACY-ADMISSION MEDICATION HISTORY
Admission medication history interview status for this patient is complete. See [facility] admission navigator for allergy information, prior to admission medications and immunization status.     Medication history interview done, indicate source(s): Patient  Medication history resources (including written lists, pill bottles, clinic record): Joaquínript and Care Everywhere  Pharmacy: [facility] in [facility]    Changes made to PTA medication list:  Added: None  Deleted: None  Changed: None    Actions taken by pharmacist (provider contacted, etc):None     Additional medication history information:None    Medication reconciliation/reorder completed by provider prior to medication history?  No    Prior to Admission medications    Not on File

## 2021-05-15 NOTE — ED NOTES
[facility]  ED Nurse Handoff Report    Aarti Clemons is a 35 year old female   ED Chief complaint: Abdominal Pain  . ED Diagnosis:   Final diagnoses:   Appendicitis, unspecified appendicitis type   Lesion of ovary     Allergies: No Known Allergies    Code Status: Full Code  Activity level - Baseline/Home:  Independent. Activity Level - Current:   Assist X 1. Lift room needed: No. Bariatric: No   Needed: No   Isolation: No. Infection: Not Applicable.     Vital Signs:   Vitals:    05/15/21 1000 05/15/21 1015 05/15/21 1045 05/15/21 1100   BP: 117/85 128/88 (!) 130/104 (!) 132/95   Pulse: 88   87   Resp:       Temp:       TempSrc:       SpO2:           Cardiac Rhythm:  ,      Pain level:    Patient confused: No. Patient Falls Risk: Yes.   Elimination Status: Due to void   Patient Report - Initial Complaint: Nausea/vomitting and abdominal pain. Focused Assessment: Abdomen tender mid/upper area. Pain 8-9/10.   Tests Performed:   Abd/pelvis CT,  IV  contrast only TRAUMA / AAA   Preliminary Result   IMPRESSION:    1.  Findings are suspicious for acute appendicitis.   2.  A right ovarian cystic lesion measures 2.6 cm, and is unchanged.   This may again represent a dominant follicle.   3.  A 1.7 cm fat density left ovarian lesion is also unchanged, and   likely represents a dermoid cyst.   4.  Diffuse fatty infiltration of the liver.        Labs Ordered and Resulted from Time of ED Arrival Up to the Time of Departure from the ED   CBC WITH PLATELETS DIFFERENTIAL - Abnormal; Notable for the following components:       Result Value    WBC 14.5 (*)     Absolute Neutrophil 12.3 (*)     All other components within normal limits   COMPREHENSIVE METABOLIC PANEL - Abnormal; Notable for the following components:    Glucose 120 (*)     All other components within normal limits   LIPASE   SARS-COV-2 (COVID-19) VIRUS RT-PCR   ISTAT HCG QUANTITATIVE PREGNANCY NURSING POCT   ISTAT HCG QUANTITATIVE  PREGNANCY POCT   . Abnormal Results: See above.   Treatments provided: IVF, Zosyn IV, IV Zofran x 2 and IV Dilaudid x2  Family Comments: Spouse at bedside  OBS brochure/video discussed/provided to patient:  Yes  ED Medications:   Medications   famotidine (PEPCID) injection 20 mg (20 mg Intravenous Given 5/15/21 0908)   ondansetron (ZOFRAN) injection 4 mg (4 mg Intravenous Given 5/15/21 0905)   0.9% sodium chloride BOLUS (1,000 mLs Intravenous New Bag 5/15/21 0905)   HYDROmorphone (PF) (DILAUDID) injection 0.5 mg (0.5 mg Intravenous Given 5/15/21 0908)   CT Scan Flush (57 mLs Intravenous Given 5/15/21 0929)   iopamidol (ISOVUE-370) solution 500 mL (68 mLs Intravenous Given 5/15/21 0929)   piperacillin-tazobactam (ZOSYN) intermittent infusion 4.5 g (4.5 g Intravenous New Bag 5/15/21 1040)   HYDROmorphone (PF) (DILAUDID) injection 0.5 mg (0.5 mg Intravenous Given 5/15/21 1038)   ondansetron (ZOFRAN) injection 4 mg (4 mg Intravenous Given 5/15/21 1041)     Drips infusing:  No  For the majority of the shift, the patient's behavior Green. Interventions performed were IVF, pain management, antibiotics and antiemetics.    Sepsis treatment initiated: No     Patient tested for COVID 19 prior to admission: YES    ED Nurse Name/Phone Number: Devyn Gutierrez RN,   11:20 AM  RECEIVING UNIT ED HANDOFF REVIEW    Above ED Nurse Handoff Report was reviewed: Yes  Reviewed by: Anh Suárez RN on May 15, 2021 at 12:02 PM

## 2021-05-15 NOTE — H&P
"[facility] - H&P     Aarti ANGELITA Clemons MRN# [id number]   Age: 35 year old YOB: 1985     HPI:  Patient has been experiencing acute RLQ abdominal pain for the past 6 hours associated with nausea, vomiting and anorexia.  These symptoms have been increasing in severity.  The patient presented to the emergency room where a CT scan revealed findings concerning for acute appendicitis.    History is obtained from the patient    Review Of Systems:  Respiratory: No shortness of breath, dyspnea on exertion, cough, or hemoptysis  Cardiovascular: negative  Gastrointestinal: as above  Genitourinary: negative  All remaining review of systems negative except as stated in HPI.    PMH:  Denies significant past medical history    PSH:  Denies significant past surgical history    Allergies:  No Known Allergies    Home Medications:  No current outpatient medications on file.       Social History:  Social History     Tobacco Use     Smoking status: Not on file   Substance Use Topics     Alcohol use: Not on file     Drug use: Not on file       Family History:  Denies significant family history      Physical Exam:  /87 (BP Location: Left arm)   Pulse 87   Temp 97.5  F (36.4  C) (Oral)   Resp 16   Ht 1.575 m (5' 2\")   Wt 65.1 kg (143 lb 8 oz)   SpO2 97%   BMI 26.25 kg/m      General appearance: healthy, alert and mild distress.  Hydration: well hydrated  HEENT: normocephalic, atraumatic  Neck: no adenopathy  Lungs: normal and clear to auscultation  Heart: regular rate and rhythm and no murmurs, clicks, or gallops  Abdomen: rounded, normal bowel sounds. Tenderness: present: RLQ moderate.  Masses: none.  Organomegaly: none  Skin: clear without rashes/lesions  Extremities: no gross deformities  Neuro: oriented to time & place, moves all extremities with normal strength, speech clear    Labs Reviewed:  Lab Results   Component Value Date    WBC 14.5 05/15/2021     Lab Results "   Component Value Date    HGB 13.8 05/15/2021     Lab Results   Component Value Date     05/15/2021       Last Basic Metabolic Panel:  Lab Results   Component Value Date     05/15/2021      Lab Results   Component Value Date    POTASSIUM 3.4 05/15/2021     Lab Results   Component Value Date    CHLORIDE 104 05/15/2021     Lab Results   Component Value Date    ANAY 9.2 05/15/2021     Lab Results   Component Value Date    CO2 26 05/15/2021     Lab Results   Component Value Date    BUN 8 05/15/2021     Lab Results   Component Value Date    CR 0.62 05/15/2021     Lab Results   Component Value Date     05/15/2021         Radiology:  CT abdomen reveals a dilated, thick-walled appendix measuring 12 mm with surrounding fat stranding.  Fecaliths are present.  All imaging studies reviewed by me.    ASSESSMENT/PLAN:  The patient's history, physical exam, laboratory and imaging studies are suspicious for acute appendicitis.  I have offered the patient laparoscopic appendectomy.  The risks, benefits, and alternatives have been discussed in detail.  All of the patient's questions have been answered.  They elect to proceed and we will go to the OR at the soonest availability.  Pre-operative antibiotics have been given in the emergency room.    Servando Cary MD

## 2021-05-15 NOTE — ANESTHESIA CARE TRANSFER NOTE
Patient: Aarti Clemons    Procedure(s):  APPENDECTOMY, LAPAROSCOPIC    Diagnosis: Appendicitis [K37]  Diagnosis Additional Information: No value filed.    Anesthesia Type:   General     Note:    Oropharynx: oropharynx clear of all foreign objects  Level of Consciousness: awake  Oxygen Supplementation: face mask    Independent Airway: airway patency satisfactory and stable  Dentition: dentition unchanged  Vital Signs Stable: post-procedure vital signs reviewed and stable  Report to RN Given: handoff report given  Patient transferred to: PACU    Handoff Report: Identifed the Patient, Identified the Reponsible Provider, Reviewed the pertinent medical history, Discussed the surgical course, Reviewed Intra-OP anesthesia mangement and issues during anesthesia, Set expectations for post-procedure period and Allowed opportunity for questions and acknowledgement of understanding      Vitals: (Last set prior to Anesthesia Care Transfer)  CRNA VITALS  5/15/2021 1552 - 5/15/2021 1625      5/15/2021             Pulse:  116    SpO2:  100 %        Electronically Signed By: CASI Aguilar CRNA  May 15, 2021  4:25 PM

## 2021-05-16 NOTE — OR NURSING
"Pt was vitally stable before going home. She had some pain and nausea episodes but resolved before her departure.   Her \"Mike was at the bedside, giving emotional support. She voided adequate amount of urine. Three abdominal lap sites with steri strips are intact with scant old serosanguineous drainage.    Discharge instructions completed in Welsh and in English both. Medications from Pharmacy given to pt and her .  "

## 2021-05-17 LAB — INTERPRETATION ECG - MUSE: NORMAL

## 2021-05-18 LAB — COPATH REPORT: NORMAL

## 2021-06-01 ENCOUNTER — TELEPHONE (OUTPATIENT)
Dept: SURGERY | Facility: CLINIC | Age: 36
End: 2021-06-01

## 2021-06-01 ENCOUNTER — APPOINTMENT (OUTPATIENT)
Dept: INTERPRETER SERVICES | Facility: CLINIC | Age: 36
End: 2021-06-01
Payer: COMMERCIAL

## 2021-06-01 NOTE — TELEPHONE ENCOUNTER
Attempted to call patient with a  for post op check. No answer and I was not able to leave a message (phone was not set up for messages).    Reagan Solano PA-C

## 2023-01-30 ENCOUNTER — HEALTH MAINTENANCE LETTER (OUTPATIENT)
Age: 38
End: 2023-01-30

## 2024-03-02 ENCOUNTER — HEALTH MAINTENANCE LETTER (OUTPATIENT)
Age: 39
End: 2024-03-02

## 2025-06-23 ENCOUNTER — HOSPITAL ENCOUNTER (EMERGENCY)
Facility: CLINIC | Age: 40
Discharge: HOME | End: 2025-06-23
Attending: EMERGENCY MEDICINE | Admitting: EMERGENCY MEDICINE
Payer: COMMERCIAL

## 2025-06-23 ENCOUNTER — APPOINTMENT (OUTPATIENT)
Dept: GENERAL RADIOLOGY | Facility: CLINIC | Age: 40
End: 2025-06-23
Attending: EMERGENCY MEDICINE
Payer: COMMERCIAL

## 2025-06-23 ENCOUNTER — APPOINTMENT (OUTPATIENT)
Dept: CT IMAGING | Facility: CLINIC | Age: 40
End: 2025-06-23
Attending: EMERGENCY MEDICINE
Payer: COMMERCIAL

## 2025-06-23 ENCOUNTER — VIRTUAL VISIT (OUTPATIENT)
Dept: INTERPRETER SERVICES | Facility: CLINIC | Age: 40
End: 2025-06-23

## 2025-06-23 VITALS
SYSTOLIC BLOOD PRESSURE: 139 MMHG | TEMPERATURE: 97.5 F | DIASTOLIC BLOOD PRESSURE: 102 MMHG | OXYGEN SATURATION: 100 % | RESPIRATION RATE: 22 BRPM | HEART RATE: 71 BPM

## 2025-06-23 DIAGNOSIS — R03.0 ELEVATED BLOOD PRESSURE READING WITHOUT DIAGNOSIS OF HYPERTENSION: ICD-10-CM

## 2025-06-23 DIAGNOSIS — R91.8 PULMONARY NODULES: ICD-10-CM

## 2025-06-23 DIAGNOSIS — R07.9 NONSPECIFIC CHEST PAIN: ICD-10-CM

## 2025-06-23 DIAGNOSIS — F41.1 ANXIETY STATE: ICD-10-CM

## 2025-06-23 LAB
ALBUMIN SERPL BCG-MCNC: 4.7 G/DL (ref 3.5–5.2)
ALBUMIN UR-MCNC: NEGATIVE MG/DL
ALP SERPL-CCNC: 68 U/L (ref 40–150)
ALT SERPL W P-5'-P-CCNC: 88 U/L (ref 0–50)
ANION GAP SERPL CALCULATED.3IONS-SCNC: 11 MMOL/L (ref 7–15)
APPEARANCE UR: ABNORMAL
AST SERPL W P-5'-P-CCNC: 48 U/L (ref 0–45)
ATRIAL RATE - MUSE: 76 BPM
BACTERIA #/AREA URNS HPF: ABNORMAL /HPF
BASOPHILS # BLD AUTO: 0 10E3/UL (ref 0–0.2)
BASOPHILS NFR BLD AUTO: 0 %
BILIRUB SERPL-MCNC: 0.7 MG/DL
BILIRUB UR QL STRIP: NEGATIVE
BUN SERPL-MCNC: 8 MG/DL (ref 6–20)
CALCIUM SERPL-MCNC: 9.7 MG/DL (ref 8.8–10.4)
CHLORIDE SERPL-SCNC: 104 MMOL/L (ref 98–107)
COLOR UR AUTO: ABNORMAL
CREAT SERPL-MCNC: 0.63 MG/DL (ref 0.51–0.95)
DIASTOLIC BLOOD PRESSURE - MUSE: NORMAL MMHG
EGFRCR SERPLBLD CKD-EPI 2021: >90 ML/MIN/1.73M2
EOSINOPHIL # BLD AUTO: 0.1 10E3/UL (ref 0–0.7)
EOSINOPHIL NFR BLD AUTO: 1 %
ERYTHROCYTE [DISTWIDTH] IN BLOOD BY AUTOMATED COUNT: 12.8 % (ref 10–15)
GLUCOSE SERPL-MCNC: 102 MG/DL (ref 70–99)
GLUCOSE UR STRIP-MCNC: NEGATIVE MG/DL
HCG SERPL QL: NEGATIVE
HCO3 SERPL-SCNC: 24 MMOL/L (ref 22–29)
HCT VFR BLD AUTO: 38.8 % (ref 35–47)
HGB BLD-MCNC: 13.8 G/DL (ref 11.7–15.7)
HGB UR QL STRIP: ABNORMAL
HOLD SPECIMEN: NORMAL
IMM GRANULOCYTES # BLD: 0 10E3/UL
IMM GRANULOCYTES NFR BLD: 0 %
INTERPRETATION ECG - MUSE: NORMAL
KETONES UR STRIP-MCNC: NEGATIVE MG/DL
LEUKOCYTE ESTERASE UR QL STRIP: ABNORMAL
LIPASE SERPL-CCNC: 23 U/L (ref 13–60)
LYMPHOCYTES # BLD AUTO: 2.8 10E3/UL (ref 0.8–5.3)
LYMPHOCYTES NFR BLD AUTO: 31 %
MCH RBC QN AUTO: 31.5 PG (ref 26.5–33)
MCHC RBC AUTO-ENTMCNC: 35.6 G/DL (ref 31.5–36.5)
MCV RBC AUTO: 89 FL (ref 78–100)
MONOCYTES # BLD AUTO: 0.4 10E3/UL (ref 0–1.3)
MONOCYTES NFR BLD AUTO: 5 %
MUCOUS THREADS #/AREA URNS LPF: PRESENT /LPF
NEUTROPHILS # BLD AUTO: 5.7 10E3/UL (ref 1.6–8.3)
NEUTROPHILS NFR BLD AUTO: 63 %
NITRATE UR QL: NEGATIVE
NRBC # BLD AUTO: 0 10E3/UL
NRBC BLD AUTO-RTO: 0 /100
P AXIS - MUSE: 48 DEGREES
PH UR STRIP: 8 [PH] (ref 5–7)
PLATELET # BLD AUTO: 381 10E3/UL (ref 150–450)
POTASSIUM SERPL-SCNC: 3.7 MMOL/L (ref 3.4–5.3)
PR INTERVAL - MUSE: 144 MS
PROT SERPL-MCNC: 7.4 G/DL (ref 6.4–8.3)
QRS DURATION - MUSE: 76 MS
QT - MUSE: 388 MS
QTC - MUSE: 436 MS
R AXIS - MUSE: 71 DEGREES
RADIOLOGIST FLAGS: NORMAL
RBC # BLD AUTO: 4.38 10E6/UL (ref 3.8–5.2)
RBC URINE: 1 /HPF
SODIUM SERPL-SCNC: 139 MMOL/L (ref 135–145)
SP GR UR STRIP: 1 (ref 1–1.03)
SQUAMOUS EPITHELIAL: 6 /HPF
SYSTOLIC BLOOD PRESSURE - MUSE: NORMAL MMHG
T AXIS - MUSE: 30 DEGREES
TROPONIN T SERPL HS-MCNC: <6 NG/L
UROBILINOGEN UR STRIP-MCNC: NORMAL MG/DL
VENTRICULAR RATE- MUSE: 76 BPM
WBC # BLD AUTO: 9 10E3/UL (ref 4–11)
WBC URINE: 2 /HPF

## 2025-06-23 PROCEDURE — 81001 URINALYSIS AUTO W/SCOPE: CPT | Performed by: EMERGENCY MEDICINE

## 2025-06-23 PROCEDURE — 36415 COLL VENOUS BLD VENIPUNCTURE: CPT | Performed by: EMERGENCY MEDICINE

## 2025-06-23 PROCEDURE — 84703 CHORIONIC GONADOTROPIN ASSAY: CPT | Performed by: EMERGENCY MEDICINE

## 2025-06-23 PROCEDURE — 83690 ASSAY OF LIPASE: CPT | Performed by: EMERGENCY MEDICINE

## 2025-06-23 PROCEDURE — 85004 AUTOMATED DIFF WBC COUNT: CPT | Performed by: EMERGENCY MEDICINE

## 2025-06-23 PROCEDURE — 82040 ASSAY OF SERUM ALBUMIN: CPT | Performed by: EMERGENCY MEDICINE

## 2025-06-23 PROCEDURE — 99285 EMERGENCY DEPT VISIT HI MDM: CPT | Mod: 25

## 2025-06-23 PROCEDURE — 93005 ELECTROCARDIOGRAM TRACING: CPT

## 2025-06-23 PROCEDURE — 71046 X-RAY EXAM CHEST 2 VIEWS: CPT

## 2025-06-23 PROCEDURE — 85025 COMPLETE CBC W/AUTO DIFF WBC: CPT | Performed by: EMERGENCY MEDICINE

## 2025-06-23 PROCEDURE — 71250 CT THORAX DX C-: CPT

## 2025-06-23 PROCEDURE — 80053 COMPREHEN METABOLIC PANEL: CPT | Performed by: EMERGENCY MEDICINE

## 2025-06-23 PROCEDURE — 84484 ASSAY OF TROPONIN QUANT: CPT | Performed by: EMERGENCY MEDICINE

## 2025-06-23 ASSESSMENT — COLUMBIA-SUICIDE SEVERITY RATING SCALE - C-SSRS
2. HAVE YOU ACTUALLY HAD ANY THOUGHTS OF KILLING YOURSELF IN THE PAST MONTH?: NO
6. HAVE YOU EVER DONE ANYTHING, STARTED TO DO ANYTHING, OR PREPARED TO DO ANYTHING TO END YOUR LIFE?: NO
1. IN THE PAST MONTH, HAVE YOU WISHED YOU WERE DEAD OR WISHED YOU COULD GO TO SLEEP AND NOT WAKE UP?: NO

## 2025-06-23 ASSESSMENT — ACTIVITIES OF DAILY LIVING (ADL)
ADLS_ACUITY_SCORE: 41

## 2025-06-23 NOTE — ED PROVIDER NOTES
Emergency Department Note      History of Present Illness     Chief Complaint   Dizziness      HPI - A formal  was utilized  Aarti ANGELITA Clemons is a 40 year old female who presents to the ED for evaluation of dizziness. The patient reports that she was at work this morning when she had an onset of symptoms such as dizziness, nausea, chest pressure, shortness of breath, and oral paresthesia. She states that she tried to eat half of a sandwich then had an episode of emesis. She also tried walking after becoming dizzy but was feeling imbalanced. Patient had her blood pressure checked after the onset of her symptoms and she was hypertensive. Patient reports that she developed a headache en route to the ED. However, the patient states that her symptoms relieved around 1430 this afternoon. She notes that she only slept about 2.5 hours last night. Patient reports that she has felt increasing anxiety recently as well. Patient has had similar symptoms in the past but is unsure if they were related to hypertension. Patient is otherwise healthy.    Independent Historian   None    Review of External Notes   I reviewed the urgent care visit from earlier today referring the patient to the ER for further evaluation.  I reviewed the primary care office visit from 3/25/2025 regarding her chronic medical problems and their management.    Past Medical History     Medical History and Problem List   Depression  Anxiety    Medications   None    Surgical History   Appendectomy    Physical Exam     Patient Vitals for the past 24 hrs:   BP Temp Temp src Pulse Resp SpO2   06/23/25 1813 (!) 139/102 -- -- 71 -- 100 %   06/23/25 1228 (!) 147/99 -- -- -- -- --   06/23/25 1224 -- 97.5  F (36.4  C) Temporal 71 22 100 %     Physical Exam  Vitals and nursing note reviewed.   Constitutional:       General: She is not in acute distress.     Appearance: She is not ill-appearing.   HENT:      Head: Normocephalic and  atraumatic.      Right Ear: External ear normal.      Left Ear: External ear normal.      Nose: Nose normal.      Mouth/Throat:      Mouth: Mucous membranes are moist.   Eyes:      Extraocular Movements: Extraocular movements intact.      Conjunctiva/sclera: Conjunctivae normal.   Cardiovascular:      Rate and Rhythm: Normal rate and regular rhythm.      Heart sounds: No murmur heard.  Pulmonary:      Effort: Pulmonary effort is normal. No respiratory distress.      Breath sounds: Normal breath sounds. No wheezing, rhonchi or rales.   Abdominal:      General: Abdomen is flat. Bowel sounds are normal. There is no distension.      Palpations: Abdomen is soft.      Tenderness: There is no abdominal tenderness. There is no guarding or rebound.   Musculoskeletal:         General: No deformity or signs of injury.      Cervical back: Normal range of motion and neck supple.   Skin:     General: Skin is warm and dry.      Findings: No rash.   Neurological:      Mental Status: She is alert and oriented to person, place, and time.      Cranial Nerves: No cranial nerve deficit.      Sensory: No sensory deficit.      Motor: No weakness.      Gait: Gait normal.   Psychiatric:         Mood and Affect: Mood is anxious.         Behavior: Behavior normal.           Diagnostics     Lab Results   Labs Ordered and Resulted from Time of ED Arrival to Time of ED Departure   COMPREHENSIVE METABOLIC PANEL - Abnormal       Result Value    Sodium 139      Potassium 3.7      Carbon Dioxide (CO2) 24      Anion Gap 11      Urea Nitrogen 8.0      Creatinine 0.63      GFR Estimate >90      Calcium 9.7      Chloride 104      Glucose 102 (*)     Alkaline Phosphatase 68      AST 48 (*)     ALT 88 (*)     Protein Total 7.4      Albumin 4.7      Bilirubin Total 0.7     ROUTINE UA WITH MICROSCOPIC REFLEX TO CULTURE - Abnormal    Color Urine Light Yellow      Appearance Urine Slightly Cloudy (*)     Glucose Urine Negative      Bilirubin Urine Negative       Ketones Urine Negative      Specific Gravity Urine 1.005      Blood Urine Large (*)     pH Urine 8.0 (*)     Protein Albumin Urine Negative      Urobilinogen Urine Normal      Nitrite Urine Negative      Leukocyte Esterase Urine Small (*)     Bacteria Urine Few (*)     Mucus Urine Present (*)     RBC Urine 1      WBC Urine 2      Squamous Epithelials Urine 6 (*)    LIPASE - Normal    Lipase 23     HCG QUALITATIVE PREGNANCY - Normal    hCG Serum Qualitative Negative     TROPONIN T, HIGH SENSITIVITY - Normal    Troponin T, High Sensitivity <6     CBC WITH PLATELETS AND DIFFERENTIAL    WBC Count 9.0      RBC Count 4.38      Hemoglobin 13.8      Hematocrit 38.8      MCV 89      MCH 31.5      MCHC 35.6      RDW 12.8      Platelet Count 381      % Neutrophils 63      % Lymphocytes 31      % Monocytes 5      % Eosinophils 1      % Basophils 0      % Immature Granulocytes 0      NRBCs per 100 WBC 0      Absolute Neutrophils 5.7      Absolute Lymphocytes 2.8      Absolute Monocytes 0.4      Absolute Eosinophils 0.1      Absolute Basophils 0.0      Absolute Immature Granulocytes 0.0      Absolute NRBCs 0.0         Imaging   Chest CT w/o contrast   Final Result   IMPRESSION:    1.  Multiple bilateral pulmonary nodules, largest of which are likely centrally calcified, suggestive of granulomas in absence of known primary malignancy, could consider follow-up described below for smaller noncalcified nodules.   2.  Diffuse hepatic steatosis.      REFERENCE:   Guidelines for Management of Incidental Pulmonary Nodules Detected on CT Images: From the Fleischner Society 2017.    Guidelines apply to incidental nodules in patients who are 35 years or older.   Guidelines do not apply to lung cancer screening, patients with immunosuppression, or patients with known primary cancer.      MULTIPLE NODULES      Nodule size 6 mm or larger   Low-risk patients: Follow-up CT at 3-6 months, then consider CT at 18-24 months.   High-risk patients:  Follow-up CT at 3-6 months, then at 18-24 months if no change.   -Use most suspicious nodule as guide to management.            XR Chest 2 Views   Final Result   IMPRESSION: 12 mm nodular opacity overlies the lateral right midlung. Recommend further characterization with chest CT. Otherwise negative chest.         [Recommend Follow Up: Lung nodule]      This report will be copied to the [facility] to ensure a provider acknowledges the finding.              EKG   ECG taken at 12:58, ECG read at 13:07  Normal sinus rhythm   No significant change as compared to prior, dated 5/15/21.  Rate 76 bpm. WA interval 144 ms. QRS duration 76 ms. QT/QTc 388/436 ms. P-R-T axes 48 71 30.    Independent Interpretation   CXR: No pneumothorax or infiltrate.    ED Course      Medications Administered   Medications - No data to display    Procedures   None      Discussion of Management   None    ED Course   ED Course as of 06/23/25 2207   Mon Jun 23, 2025   1542 I obtained history and examined the patient as noted above.        Additional Documentation  None    Medical Decision Making / Diagnosis     CMS Diagnoses: None    MIPS   None    [facility]   Aarti J Albaro Clemons is a 40 year old female who presents with an episode of chest pain, shortness of breath, perioral numbness, headache, hypertension.  She now feels improved in the ED.  She admits that she did not sleep very much last night and has been under increased stress and anxiety recently.  I suspect this may be the etiology of her symptoms, however other possibilities include ACS, PE, dissection, hypocalcemia, hypertensive emergency, etc.  Workup was pursued as noted.  Her EKG was nonischemic.  Her troponin is undetectable.  She is low risk for PE and PERC negative so low suspicion for PE.  Chest x-ray shows no astatic widening so I doubt aortic dissection especially with the atypical presentation.  Her chest x-ray did show some pulmonary nodules so a CT was performed  and showed a granuloma and similar smaller or nodules which will need outpatient follow-up.  Her LFTs are also slightly elevated but she has no right upper quadrant tenderness to suggest an acute hepatobiliary cause.  She will need close follow-up for this as well.  We discussed return precautions    Disposition   The patient was discharged.     Diagnosis     ICD-10-CM    1. Nonspecific chest pain  R07.9       2. Elevated blood pressure reading without diagnosis of hypertension  R03.0       3. Anxiety state  F41.1       4. Pulmonary nodules  R91.8         Scribe Disclosure:  I, Diego Steele, am serving as a scribe at 3:42 PM on 6/23/2025 to document services personally performed by Filipe Bonner MD based on my observations and the provider's statements to me.        Filipe Bonner MD  06/23/25 2031

## 2025-06-23 NOTE — LETTER
June 23, 2025      To Whom It May Concern:      aArti Clemons was seen in our Emergency Department today, 06/23/25.  I expect her condition to improve over the next day.  She may return to work/school when improved.    Sincerely,        Miladis BUSH RN  Electronically signed

## 2025-06-23 NOTE — ED TRIAGE NOTES
Pt presents to ED with what she describes as high blood pressure.   States today she was at work and her BP was high. She was seen at  and sent to ED.   She states at work this morning, felt nausea, dizziness and shortness of breath. She developed chest pain after this. 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   Still feels dizzy. The chest pain is better, but the pain is not completely gone.

## 2025-06-28 ENCOUNTER — HEALTH MAINTENANCE LETTER (OUTPATIENT)
Age: 40
End: 2025-06-28

## (undated) DEVICE — ENDO TROCAR FIRST ENTRY KII FIOS Z-THRD 05X100MM CTF03

## (undated) DEVICE — ENDO POUCH UNIV RETRIEVAL SYSTEM INZII 10MM CD001

## (undated) DEVICE — LINEN POUCH DBL 5427

## (undated) DEVICE — ESU GROUND PAD ADULT W/CORD E7507

## (undated) DEVICE — Device

## (undated) DEVICE — SU VICRYL 4-0 P-3 18" UND J494G

## (undated) DEVICE — SU VICRYL 0 CT-2 CR 8X18" J727D

## (undated) DEVICE — LINEN HALF SHEET 5512

## (undated) DEVICE — SOL NACL 0.9% IRRIG 3000ML BAG 2B7477

## (undated) DEVICE — ESU ELEC BLADE 2.75" COATED/INSULATED E1455

## (undated) DEVICE — ESU CORD MONOPOLAR 10'  E0510

## (undated) DEVICE — ENDO TROCAR SLEEVE KII Z-THREADED 05X100MM CTS02

## (undated) DEVICE — DRSG STERI STRIP 1/2X4" R1547

## (undated) DEVICE — SOL ADH LIQUID BENZOIN SWAB 0.6ML C1544

## (undated) DEVICE — ENDO TROCAR OPTICAL ACCESS KII Z-THRD 12X100MM C0R85

## (undated) DEVICE — SUCTION IRR STRYKERFLOW II W/TIP 250-070-520

## (undated) DEVICE — ESU PENCIL W/HOLSTER E2350H

## (undated) DEVICE — BAG CLEAR TRASH 1.3M 39X33" P4040C

## (undated) DEVICE — GLOVE PROTEXIS POWDER FREE SMT 7.5  2D72PT75X

## (undated) DEVICE — STPL POWERED ECHELON 45MM PSEE45A

## (undated) DEVICE — GLOVE PROTEXIS POWDER FREE 8.0 ORTHOPEDIC 2D73ET80

## (undated) DEVICE — LINEN FULL SHEET 5511

## (undated) DEVICE — LINEN TOWEL PACK X10 5473

## (undated) DEVICE — STPL ENDO RELOAD ECHELON 45X2.0MM GREY ECR45M

## (undated) DEVICE — NDL 22GA 1.5"

## (undated) RX ORDER — OXYCODONE HYDROCHLORIDE 5 MG/1
TABLET ORAL
Status: DISPENSED
Start: 2021-05-15

## (undated) RX ORDER — DEXAMETHASONE SODIUM PHOSPHATE 4 MG/ML
INJECTION, SOLUTION INTRA-ARTICULAR; INTRALESIONAL; INTRAMUSCULAR; INTRAVENOUS; SOFT TISSUE
Status: DISPENSED
Start: 2021-05-15

## (undated) RX ORDER — ONDANSETRON 2 MG/ML
INJECTION INTRAMUSCULAR; INTRAVENOUS
Status: DISPENSED
Start: 2021-05-15

## (undated) RX ORDER — BUPIVACAINE HYDROCHLORIDE 5 MG/ML
INJECTION, SOLUTION EPIDURAL; INTRACAUDAL
Status: DISPENSED
Start: 2021-05-15

## (undated) RX ORDER — PROPOFOL 10 MG/ML
INJECTION, EMULSION INTRAVENOUS
Status: DISPENSED
Start: 2021-05-15

## (undated) RX ORDER — HYDROMORPHONE HYDROCHLORIDE 1 MG/ML
INJECTION, SOLUTION INTRAMUSCULAR; INTRAVENOUS; SUBCUTANEOUS
Status: DISPENSED
Start: 2021-05-15

## (undated) RX ORDER — FENTANYL CITRATE 50 UG/ML
INJECTION, SOLUTION INTRAMUSCULAR; INTRAVENOUS
Status: DISPENSED
Start: 2021-05-15

## (undated) RX ORDER — LIDOCAINE HYDROCHLORIDE 10 MG/ML
INJECTION, SOLUTION EPIDURAL; INFILTRATION; INTRACAUDAL; PERINEURAL
Status: DISPENSED
Start: 2021-05-15

## (undated) RX ORDER — GLYCOPYRROLATE 0.2 MG/ML
INJECTION INTRAMUSCULAR; INTRAVENOUS
Status: DISPENSED
Start: 2021-05-15